# Patient Record
Sex: FEMALE | Race: WHITE | NOT HISPANIC OR LATINO | Employment: FULL TIME | URBAN - NONMETROPOLITAN AREA
[De-identification: names, ages, dates, MRNs, and addresses within clinical notes are randomized per-mention and may not be internally consistent; named-entity substitution may affect disease eponyms.]

---

## 2018-12-19 ENCOUNTER — OFFICE VISIT (OUTPATIENT)
Dept: FAMILY MEDICINE | Facility: OTHER | Age: 23
End: 2018-12-19
Attending: NURSE PRACTITIONER
Payer: COMMERCIAL

## 2018-12-19 VITALS
DIASTOLIC BLOOD PRESSURE: 62 MMHG | SYSTOLIC BLOOD PRESSURE: 108 MMHG | HEART RATE: 77 BPM | WEIGHT: 138 LBS | OXYGEN SATURATION: 99 %

## 2018-12-19 DIAGNOSIS — N92.6 IRREGULAR PERIODS: ICD-10-CM

## 2018-12-19 DIAGNOSIS — R10.84 ABDOMINAL PAIN, GENERALIZED: Primary | ICD-10-CM

## 2018-12-19 LAB
ALBUMIN SERPL-MCNC: 4 G/DL (ref 3.4–5)
ALBUMIN UR-MCNC: NEGATIVE MG/DL
ALP SERPL-CCNC: 95 U/L (ref 40–150)
ALT SERPL W P-5'-P-CCNC: 20 U/L (ref 0–50)
ANION GAP SERPL CALCULATED.3IONS-SCNC: 5 MMOL/L (ref 3–14)
APPEARANCE UR: CLEAR
AST SERPL W P-5'-P-CCNC: 9 U/L (ref 0–45)
BASOPHILS # BLD AUTO: 0 10E9/L (ref 0–0.2)
BASOPHILS NFR BLD AUTO: 0.5 %
BILIRUB SERPL-MCNC: 0.2 MG/DL (ref 0.2–1.3)
BILIRUB UR QL STRIP: NEGATIVE
BUN SERPL-MCNC: 13 MG/DL (ref 7–30)
CALCIUM SERPL-MCNC: 8.5 MG/DL (ref 8.5–10.1)
CHLORIDE SERPL-SCNC: 103 MMOL/L (ref 94–109)
CO2 SERPL-SCNC: 29 MMOL/L (ref 20–32)
COLOR UR AUTO: YELLOW
CREAT SERPL-MCNC: 0.73 MG/DL (ref 0.52–1.04)
CRP SERPL-MCNC: <2.9 MG/L (ref 0–8)
DIFFERENTIAL METHOD BLD: NORMAL
EOSINOPHIL # BLD AUTO: 0.1 10E9/L (ref 0–0.7)
EOSINOPHIL NFR BLD AUTO: 0.8 %
ERYTHROCYTE [DISTWIDTH] IN BLOOD BY AUTOMATED COUNT: 11.9 % (ref 10–15)
GFR SERPL CREATININE-BSD FRML MDRD: >90 ML/MIN/{1.73_M2}
GLUCOSE SERPL-MCNC: 69 MG/DL (ref 70–99)
GLUCOSE UR STRIP-MCNC: NEGATIVE MG/DL
HCG UR QL: NEGATIVE
HCT VFR BLD AUTO: 38.3 % (ref 35–47)
HGB BLD-MCNC: 13.5 G/DL (ref 11.7–15.7)
HGB UR QL STRIP: NEGATIVE
IMM GRANULOCYTES # BLD: 0 10E9/L (ref 0–0.4)
IMM GRANULOCYTES NFR BLD: 0.4 %
KETONES UR STRIP-MCNC: NEGATIVE MG/DL
LEUKOCYTE ESTERASE UR QL STRIP: NEGATIVE
LYMPHOCYTES # BLD AUTO: 2.4 10E9/L (ref 0.8–5.3)
LYMPHOCYTES NFR BLD AUTO: 31.2 %
MCH RBC QN AUTO: 31.4 PG (ref 26.5–33)
MCHC RBC AUTO-ENTMCNC: 35.2 G/DL (ref 31.5–36.5)
MCV RBC AUTO: 89 FL (ref 78–100)
MONOCYTES # BLD AUTO: 0.6 10E9/L (ref 0–1.3)
MONOCYTES NFR BLD AUTO: 8.2 %
NEUTROPHILS # BLD AUTO: 4.6 10E9/L (ref 1.6–8.3)
NEUTROPHILS NFR BLD AUTO: 58.9 %
NITRATE UR QL: NEGATIVE
NRBC # BLD AUTO: 0 10*3/UL
NRBC BLD AUTO-RTO: 0 /100
PH UR STRIP: 7 PH (ref 4.7–8)
PLATELET # BLD AUTO: 255 10E9/L (ref 150–450)
POTASSIUM SERPL-SCNC: 3.9 MMOL/L (ref 3.4–5.3)
PROT SERPL-MCNC: 7.3 G/DL (ref 6.8–8.8)
RBC # BLD AUTO: 4.3 10E12/L (ref 3.8–5.2)
SODIUM SERPL-SCNC: 137 MMOL/L (ref 133–144)
SOURCE: NORMAL
SP GR UR STRIP: 1.02 (ref 1–1.03)
TSH SERPL DL<=0.005 MIU/L-ACNC: 1.14 MU/L (ref 0.4–4)
UROBILINOGEN UR STRIP-MCNC: NORMAL MG/DL (ref 0–2)
WBC # BLD AUTO: 7.8 10E9/L (ref 4–11)

## 2018-12-19 PROCEDURE — 81003 URINALYSIS AUTO W/O SCOPE: CPT | Performed by: NURSE PRACTITIONER

## 2018-12-19 PROCEDURE — 81025 URINE PREGNANCY TEST: CPT | Performed by: NURSE PRACTITIONER

## 2018-12-19 PROCEDURE — 99203 OFFICE O/P NEW LOW 30 MIN: CPT | Performed by: NURSE PRACTITIONER

## 2018-12-19 PROCEDURE — 36415 COLL VENOUS BLD VENIPUNCTURE: CPT | Performed by: NURSE PRACTITIONER

## 2018-12-19 PROCEDURE — 86140 C-REACTIVE PROTEIN: CPT | Performed by: NURSE PRACTITIONER

## 2018-12-19 PROCEDURE — 80050 GENERAL HEALTH PANEL: CPT | Performed by: NURSE PRACTITIONER

## 2018-12-19 ASSESSMENT — ANXIETY QUESTIONNAIRES
4. TROUBLE RELAXING: NOT AT ALL
2. NOT BEING ABLE TO STOP OR CONTROL WORRYING: NOT AT ALL
7. FEELING AFRAID AS IF SOMETHING AWFUL MIGHT HAPPEN: NOT AT ALL
GAD7 TOTAL SCORE: 0
3. WORRYING TOO MUCH ABOUT DIFFERENT THINGS: NOT AT ALL
5. BEING SO RESTLESS THAT IT IS HARD TO SIT STILL: NOT AT ALL
6. BECOMING EASILY ANNOYED OR IRRITABLE: NOT AT ALL
1. FEELING NERVOUS, ANXIOUS, OR ON EDGE: NOT AT ALL
IF YOU CHECKED OFF ANY PROBLEMS ON THIS QUESTIONNAIRE, HOW DIFFICULT HAVE THESE PROBLEMS MADE IT FOR YOU TO DO YOUR WORK, TAKE CARE OF THINGS AT HOME, OR GET ALONG WITH OTHER PEOPLE: NOT DIFFICULT AT ALL

## 2018-12-19 ASSESSMENT — PAIN SCALES - GENERAL: PAINLEVEL: NO PAIN (0)

## 2018-12-19 ASSESSMENT — PATIENT HEALTH QUESTIONNAIRE - PHQ9: SUM OF ALL RESPONSES TO PHQ QUESTIONS 1-9: 3

## 2018-12-20 ASSESSMENT — ANXIETY QUESTIONNAIRES: GAD7 TOTAL SCORE: 0

## 2018-12-20 NOTE — NURSING NOTE
Chief Complaint   Patient presents with     Establish Care     Abdominal Pain       Initial /62 (BP Location: Right arm, Patient Position: Sitting, Cuff Size: Adult Regular)   Pulse 77   Wt 62.6 kg (138 lb)   SpO2 99%  There is no height or weight on file to calculate BMI.  Medication Reconciliation: complete    Kirstin Joseph MA

## 2018-12-20 NOTE — PATIENT INSTRUCTIONS
Patient Education     Unknown Causes of Abdominal Pain (Female)    The exact cause of your belly (abdominal) pain is not clear. This does not mean that this is something to worry about. Everyone likes to know the exact cause of the problem. But sometimes with belly pain, there is no clear-cut cause, and this could be a good thing. The good news is that your symptoms can be treated, and you will feel better.   Your condition does not seem serious now. But sometimes the signs of a serious problem may take more time to appear. For this reason, it is important for you to watch for any new symptoms, problems, or worsening of your condition.  Over the next few days, the abdominal pain may come and go. Or it may be constant. Other common symptoms can include nausea and vomiting. Sometimes it can be difficult to tell if you feel nauseous. You may just feel bad and not connect that feeling to nausea. Constipation, diarrhea, and a fever may go along with the pain.  The pain may continue even if treated correctly over the following days. Depending on how things go, sometimes the cause can become clear and may need more or different treatment. Additional evaluations, medicines, or tests may also be needed.  Home care  Your healthcare provider may prescribe medicine for pain, symptoms, or an infection.  Follow the healthcare provider's instructions for taking these medicines.  General care    Rest as much as you can until your next exam. No strenuous activities.    Try to find positions that ease discomfort. A small pillow placed on the abdomen may help relieve pain.    Something warm on your abdomen (such as a heating pad) may help, but be careful not to burn yourself.  Diet    Don t force yourself to eat, especially if having cramps, vomiting, or diarrhea.    Water is important so you don't get dehydrated. Soup may also be good. Sports drinks may also help, especially if they are not too acidic. Don't drink sugary drinks as  this can make things worse. Take liquids in small amounts. Don t guzzle them.    Caffeine sometimes makes the pain and cramping worse.    Don t take dairy products if you have vomiting or diarrhea.    Don't eat large amounts at a time. Wait a few minutes between bites.    Eat a diet low in fiber (called a low-residue diet). Foods allowed include refined breads, white rice, fruit and vegetable juices without pulp, tender meats. These foods will pass more easily through the intestine.    Don t have whole-grain foods, whole fruits and vegetables, meats, seeds and nuts, fried or fatty foods, dairy, alcohol and spicy foods until your symptoms go away.  Follow-up care  Follow up with your healthcare provider, or as advised, if your pain does not begin to improve in the next 24 hours.  Call 911  Call 911 if any of these occur:    Trouble breathing    Confusion    Fainting or loss of consciousness    Rapid heart rate    Seizure  When to seek medical advice  Call your healthcare provider right away if any of these occur:    Pain gets worse or moves to the right lower abdomen    New or worsening vomiting or diarrhea    Swelling of the abdomen    Unable to pass stool for more than 3 days    Fever of 100.4 F (38 C) or higher, or as directed by your healthcare provider.    Blood in vomit or bowel movements (dark red or black color)    Yellow color of eyes and skin (jaundice)    Weakness, dizziness    Chest, arm, back, neck, or jaw pain    Unexpected vaginal bleeding or missed period    Can't keep down liquids or water and you are getting dehydrated  Date Last Reviewed: 6/1/2018 2000-2018 [employer]. [address] [place] [address], [place] [address]. All rights reserved. This information is not intended as a substitute for professional medical care. Always follow your healthcare professional's instructions.    Patient Education     Polycystic Ovary Syndrome  Polycystic ovary syndrome (PCOS) causes harmless, small cysts  in the ovaries and other symptoms. PCOS is caused by certain hormones being out of balance. The word  syndrome  means a group of symptoms. Women with PCOS may have no periods, irregular periods, or very long periods.    Your ovaries  Women store their eggs in their ovaries. Each egg is in a capsule called a follicle. Normally during the reproductive years, one follicle grows to produce a mature egg each month. This egg is released during ovulation and the follicle dissolves.  Hormones out of balance  With polycystic ovary syndrome (PCOS), the hormones that control ovulation are out of balance. These include estrogen, progesterone, and androgen. As a result, ovulation may not occur. Instead, the follicle stays enlarged. This is a fluid-filled sac called a cyst. Over time, the ovaries fill with many small cysts. This is why they are called  poly  or many  cystic  ovaries. In some women, the ovaries also make too much androgen.  PCOS symptoms  Women with PCOS may also have one or more of these symptoms:    Acne    Hair growth on the face and other parts of the body    Patches of thickened, velvety, darkened skin called acanthosis nigricans    Trouble getting pregnant (fertility problems)    Weight gain, especially around the waist   Women with PCOS are also at increased risk of developing cancer of the uterine lining, diabetes, and heart disease.   Date Last Reviewed: 6/1/2017 2000-2018 [employer]. [address] [place] [address], [place] [address]. All rights reserved. This information is not intended as a substitute for professional medical care. Always follow your healthcare professional's instructions.

## 2018-12-20 NOTE — PROGRESS NOTES
SUBJECTIVE:   Obie Norris is a 23 year old female who presents to clinic today for the following health issues:      ESTABLISH CARE    Moved to the area from [facility]. She states she moved to the [facility] in July.     Lower Abdominal Pain      Duration: ongoing, gets it 1-2 times weekly.  Initial onset 3-4 months    Description (location/character/radiation): lower abdominal. Comes and goes. Lasts for about 4-5 minutes.    Intensity:  severe    Accompanying signs and symptoms: non-formed stools for last 4 months, passes stools 3-4 times daily    History (similar episodes/previous evaluation): was told she had PCOS but then told she did not have it.  Mom has stage 4 colon cancer diagnosed at age 55 at her first colonoscopy she did not have any priior symptoms      Precipitating or alleviating factors: None    Therapies tried and outcome: None         Problem list and histories reviewed & adjusted, as indicated.  Additional history: as documented    There is no problem list on file for this patient.    History reviewed. No pertinent surgical history.    Social History     Tobacco Use     Smoking status: Never Smoker     Smokeless tobacco: Never Used   Substance Use Topics     Alcohol use: Not on file     Family History   Problem Relation Age of Onset     Colorectal Cancer Mother      Throat cancer Father      Heart Disease Maternal Grandfather      Rheumatoid Arthritis Sister          No current outpatient medications on file.     No Known Allergies    Reviewed and updated as needed this visit by clinical staff  Tobacco  Allergies  Meds  Med Hx  Surg Hx  Fam Hx  Soc Hx      Reviewed and updated as needed this visit by Provider         ROS:  CONSTITUTIONAL:NEGATIVE for fever, chills, change in weight  INTEGUMENTARY/SKIN: NEGATIVE for worrisome rashes, moles or lesions  ENT/MOUTH: NEGATIVE for ear, mouth and throat problems  RESP:NEGATIVE for significant cough or SOB  CV: NEGATIVE for chest pain, palpitations or  peripheral edema  GI: lower abdominal cramping, abdominal pain generalized and gas or bloating  : abnormal menstrual cycle about 2-3 per year, when she was on birth control she had regular periods   MUSCULOSKELETAL: NEGATIVE for significant arthralgias or myalgia  NEURO: hand and feet fall asleep   ENDOCRINE: irregular periods   PSYCHIATRIC: NEGATIVE for changes in mood or affect    OBJECTIVE:     /62 (BP Location: Right arm, Patient Position: Sitting, Cuff Size: Adult Regular)   Pulse 77   Wt 62.6 kg (138 lb)   SpO2 99%   There is no height or weight on file to calculate BMI.   GENERAL: healthy, alert and no distress  HENT: ear canals and TM's normal, nose and mouth without ulcers or lesions  NECK: no adenopathy, no asymmetry, masses, or scars and thyroid normal to palpation  RESP: lungs clear to auscultation - no rales, rhonchi or wheezes  CV: regular rate and rhythm, normal S1 S2, no S3 or S4, no murmur, click or rub, no peripheral edema and peripheral pulses strong  ABDOMEN: tenderness mild tenderness across lower abdomen and bowel sounds normal  MS: no gross musculoskeletal defects noted, no edema  SKIN: no suspicious lesions or rashes  NEURO: Normal strength and tone, mentation intact and speech normal  PSYCH: mentation appears normal, affect normal/bright  LYMPH: normal ant/post cervical, supraclavicular nodes    Diagnostic Test Results:  Results for orders placed or performed in visit on 12/19/18 (from the past 24 hour(s))   CBC with platelets differential   Result Value Ref Range    WBC 7.8 4.0 - 11.0 10e9/L    RBC Count 4.30 3.8 - 5.2 10e12/L    Hemoglobin 13.5 11.7 - 15.7 g/dL    Hematocrit 38.3 35.0 - 47.0 %    MCV 89 78 - 100 fl    MCH 31.4 26.5 - 33.0 pg    MCHC 35.2 31.5 - 36.5 g/dL    RDW 11.9 10.0 - 15.0 %    Platelet Count 255 150 - 450 10e9/L    Diff Method Automated Method     % Neutrophils 58.9 %    % Lymphocytes 31.2 %    % Monocytes 8.2 %    % Eosinophils 0.8 %    % Basophils 0.5 %     % Immature Granulocytes 0.4 %    Nucleated RBCs 0 0 /100    Absolute Neutrophil 4.6 1.6 - 8.3 10e9/L    Absolute Lymphocytes 2.4 0.8 - 5.3 10e9/L    Absolute Monocytes 0.6 0.0 - 1.3 10e9/L    Absolute Eosinophils 0.1 0.0 - 0.7 10e9/L    Absolute Basophils 0.0 0.0 - 0.2 10e9/L    Abs Immature Granulocytes 0.0 0 - 0.4 10e9/L    Absolute Nucleated RBC 0.0    UA reflex to Microscopic and Culture - [place]   Result Value Ref Range    Color Urine Yellow     Appearance Urine Clear     Glucose Urine Negative NEG^Negative mg/dL    Bilirubin Urine Negative NEG^Negative    Ketones Urine Negative NEG^Negative mg/dL    Specific Gravity Urine 1.020 1.003 - 1.035    Blood Urine Negative NEG^Negative    pH Urine 7.0 4.7 - 8.0 pH    Protein Albumin Urine Negative NEG^Negative mg/dL    Urobilinogen mg/dL Normal 0.0 - 2.0 mg/dL    Nitrite Urine Negative NEG^Negative    Leukocyte Esterase Urine Negative NEG^Negative    Source Midstream Urine    HCG Qual, Urine - [facility],  [facility]  ([id number])   Result Value Ref Range    HCG Qual Urine Negative NEG^Negative     Additional labs pending plan to notify once available.     ASSESSMENT/PLAN:     1. Abdominal pain, generalized  Plan for referral to ob/gyn due to irregular periods and history of ovarian cyst.  - Comprehensive metabolic panel (BMP + Alb, Alk Phos, ALT, AST, Total. Bili, TP)  - CBC with platelets differential  - CRP, inflammation  - TSH with free T4 reflex  - UA reflex to Microscopic and Culture - [place]  - HCG Qual, Urine - [facility],  [facility]  ([id number])  - US TRANSVAGINAL, NON-OB; Future    2. Irregular periods  As above  - US TRANSVAGINAL, NON-OB; Future  - OB/GYN REFERRAL    If US negative may need to consider additional imaging.     See Patient Instructions    [facility] - [place]

## 2018-12-21 ENCOUNTER — OFFICE VISIT (OUTPATIENT)
Dept: OBGYN | Facility: OTHER | Age: 23
End: 2018-12-21
Attending: NURSE PRACTITIONER
Payer: COMMERCIAL

## 2018-12-21 VITALS
DIASTOLIC BLOOD PRESSURE: 62 MMHG | WEIGHT: 138 LBS | HEIGHT: 62 IN | SYSTOLIC BLOOD PRESSURE: 104 MMHG | BODY MASS INDEX: 25.4 KG/M2

## 2018-12-21 DIAGNOSIS — E28.2 PCOS (POLYCYSTIC OVARIAN SYNDROME): ICD-10-CM

## 2018-12-21 DIAGNOSIS — R10.2 PELVIC PAIN IN FEMALE: Primary | ICD-10-CM

## 2018-12-21 DIAGNOSIS — K58.0 IRRITABLE BOWEL SYNDROME WITH DIARRHEA: ICD-10-CM

## 2018-12-21 PROBLEM — N93.8 DUB (DYSFUNCTIONAL UTERINE BLEEDING): Status: ACTIVE | Noted: 2018-12-21

## 2018-12-21 PROCEDURE — 99214 OFFICE O/P EST MOD 30 MIN: CPT | Performed by: OBSTETRICS & GYNECOLOGY

## 2018-12-21 PROCEDURE — 99000 SPECIMEN HANDLING OFFICE-LAB: CPT | Performed by: OBSTETRICS & GYNECOLOGY

## 2018-12-21 PROCEDURE — 36415 COLL VENOUS BLD VENIPUNCTURE: CPT | Performed by: OBSTETRICS & GYNECOLOGY

## 2018-12-21 PROCEDURE — 84270 ASSAY OF SEX HORMONE GLOBUL: CPT | Mod: 90 | Performed by: OBSTETRICS & GYNECOLOGY

## 2018-12-21 PROCEDURE — 84403 ASSAY OF TOTAL TESTOSTERONE: CPT | Mod: 90 | Performed by: OBSTETRICS & GYNECOLOGY

## 2018-12-21 PROCEDURE — 82627 DEHYDROEPIANDROSTERONE: CPT | Mod: 90 | Performed by: OBSTETRICS & GYNECOLOGY

## 2018-12-21 RX ORDER — SPIRONOLACTONE 25 MG/1
25 TABLET ORAL EVERY MORNING
Qty: 90 TABLET | Refills: 3 | Status: SHIPPED | OUTPATIENT
Start: 2018-12-21 | End: 2019-01-18

## 2018-12-21 RX ORDER — METFORMIN HCL 500 MG
500 TABLET, EXTENDED RELEASE 24 HR ORAL
Qty: 90 TABLET | Refills: 3 | Status: SHIPPED | OUTPATIENT
Start: 2018-12-21 | End: 2019-01-18

## 2018-12-21 RX ORDER — ACETAMINOPHEN AND CODEINE PHOSPHATE 120; 12 MG/5ML; MG/5ML
0.35 SOLUTION ORAL EVERY MORNING
Qty: 84 TABLET | Refills: 4 | Status: SHIPPED | OUTPATIENT
Start: 2018-12-21 | End: 2019-01-18

## 2018-12-21 ASSESSMENT — MIFFLIN-ST. JEOR: SCORE: 1329.21

## 2018-12-21 ASSESSMENT — PAIN SCALES - GENERAL: PAINLEVEL: NO PAIN (0)

## 2018-12-21 NOTE — NURSING NOTE
"Chief Complaint   Patient presents with     Consult     Consult/ irregular periods/ [place] referring        Initial /62 (BP Location: Left arm, Patient Position: Chair, Cuff Size: Adult Regular)   Ht 1.575 m (5' 2\")   Wt 62.6 kg (138 lb)   LMP 12/21/2018   BMI 25.24 kg/m   Estimated body mass index is 25.24 kg/m  as calculated from the following:    Height as of this encounter: 1.575 m (5' 2\").    Weight as of this encounter: 62.6 kg (138 lb).  Medication Reconciliation: complete    Leigh Ann Justin LPN    "

## 2018-12-21 NOTE — PROGRESS NOTES
S:   Signs and symptoms of PCOS, always irregular and sometimes infrequent menses.  Evidence of androgen excess with cystic acne and hair growth.   Has alternating and episodic sharp, stabbing, double over RIGHT and LEFT lower quadrant pain with loose stools  Feels like she may have PCOS    Lab drawn by her primary care.    O:   Lab that been done reviewed    A:   IBS with loose stool component        PCOS clinical DX        Cystic acne secondary to PCOS    P:    Discussed at length PCOS abnormal physiology, treatment and will need Ovulation Induction when she is ready for PG.    Will fully suppress ovaries with Metformin, Nor-q, and Aldactone in meantime  IBS diet and Dicyclomine    US and follow-up visit in 4 weeks    Androgen lab studies

## 2018-12-26 LAB — DHEA-S SERPL-MCNC: 239 UG/DL (ref 35–430)

## 2018-12-28 LAB
SHBG SERPL-SCNC: 24 NMOL/L (ref 30–135)
TESTOST FREE SERPL-MCNC: 0.53 NG/DL (ref 0.08–0.74)
TESTOST SERPL-MCNC: 25 NG/DL (ref 8–60)

## 2019-01-18 ENCOUNTER — OFFICE VISIT (OUTPATIENT)
Dept: OBGYN | Facility: OTHER | Age: 24
End: 2019-01-18
Attending: OBSTETRICS & GYNECOLOGY
Payer: COMMERCIAL

## 2019-01-18 ENCOUNTER — HOSPITAL ENCOUNTER (OUTPATIENT)
Dept: ULTRASOUND IMAGING | Facility: HOSPITAL | Age: 24
Discharge: HOME | End: 2019-01-18
Attending: OBSTETRICS & GYNECOLOGY | Admitting: OBSTETRICS & GYNECOLOGY
Payer: COMMERCIAL

## 2019-01-18 VITALS
SYSTOLIC BLOOD PRESSURE: 104 MMHG | BODY MASS INDEX: 25.03 KG/M2 | DIASTOLIC BLOOD PRESSURE: 60 MMHG | WEIGHT: 136 LBS | HEART RATE: 60 BPM | HEIGHT: 62 IN

## 2019-01-18 DIAGNOSIS — E73.9 LACTOSE INTOLERANCE IN ADULT: ICD-10-CM

## 2019-01-18 DIAGNOSIS — E28.2 PCOS (POLYCYSTIC OVARIAN SYNDROME): ICD-10-CM

## 2019-01-18 DIAGNOSIS — R10.2 PELVIC PAIN IN FEMALE: ICD-10-CM

## 2019-01-18 DIAGNOSIS — N93.8 DUB (DYSFUNCTIONAL UTERINE BLEEDING): Primary | ICD-10-CM

## 2019-01-18 DIAGNOSIS — K58.0 IRRITABLE BOWEL SYNDROME WITH DIARRHEA: ICD-10-CM

## 2019-01-18 PROCEDURE — 76830 TRANSVAGINAL US NON-OB: CPT | Mod: TC

## 2019-01-18 PROCEDURE — 99213 OFFICE O/P EST LOW 20 MIN: CPT | Performed by: OBSTETRICS & GYNECOLOGY

## 2019-01-18 RX ORDER — DICYCLOMINE HYDROCHLORIDE 10 MG/1
10 CAPSULE ORAL
Qty: 360 CAPSULE | Refills: 3 | Status: SHIPPED | OUTPATIENT
Start: 2019-01-18 | End: 2020-01-22

## 2019-01-18 RX ORDER — SPIRONOLACTONE 25 MG/1
25 TABLET ORAL 2 TIMES DAILY WITH MEALS
Qty: 90 TABLET | Refills: 3 | Status: SHIPPED | OUTPATIENT
Start: 2019-01-18 | End: 2019-07-26

## 2019-01-18 RX ORDER — ACETAMINOPHEN AND CODEINE PHOSPHATE 120; 12 MG/5ML; MG/5ML
0.35 SOLUTION ORAL 2 TIMES DAILY WITH MEALS
Qty: 84 TABLET | Refills: 4 | Status: SHIPPED | OUTPATIENT
Start: 2019-01-18 | End: 2019-11-01

## 2019-01-18 RX ORDER — METFORMIN HCL 500 MG
500 TABLET, EXTENDED RELEASE 24 HR ORAL 2 TIMES DAILY WITH MEALS
Qty: 90 TABLET | Refills: 3 | Status: SHIPPED | OUTPATIENT
Start: 2019-01-18 | End: 2019-06-24

## 2019-01-18 ASSESSMENT — MIFFLIN-ST. JEOR: SCORE: 1320.14

## 2019-01-18 ASSESSMENT — PAIN SCALES - GENERAL: PAINLEVEL: NO PAIN (0)

## 2019-01-18 NOTE — PATIENT INSTRUCTIONS
Will increase all PCOS meds to full dose twice daily.   Add Bentyl for IBS  Continue lactose and gluten free diet.

## 2019-01-18 NOTE — NURSING NOTE
"Chief Complaint   Patient presents with     RECHECK       Initial /60   Pulse 60   Ht 1.575 m (5' 2\")   Wt 61.7 kg (136 lb)   LMP 12/21/2018   BMI 24.87 kg/m   Estimated body mass index is 24.87 kg/m  as calculated from the following:    Height as of this encounter: 1.575 m (5' 2\").    Weight as of this encounter: 61.7 kg (136 lb).  Medication Reconciliation: complete    Krystyna Burns LPN    "

## 2019-01-18 NOTE — PROGRESS NOTES
S:   Doing much better with gluten free and lactose free diets. Did not get bentyl at pharmacy.  Doing well with all pcos meds.    O:    Lab reviewed          US reviewed and shows CLASSIC peripheral PCOS cortical follicles in both ovaries.    A:   PCOS        Gluten and lactose sensitivity with IBS    P:   Increase PCOS meds to full dosaging at twice daily.         Add Bentyl for IBS         Follow-up 2 months

## 2019-03-12 ENCOUNTER — OFFICE VISIT (OUTPATIENT)
Dept: OBGYN | Facility: OTHER | Age: 24
End: 2019-03-12
Attending: OBSTETRICS & GYNECOLOGY
Payer: COMMERCIAL

## 2019-03-12 VITALS
WEIGHT: 134 LBS | SYSTOLIC BLOOD PRESSURE: 100 MMHG | HEIGHT: 62 IN | BODY MASS INDEX: 24.66 KG/M2 | DIASTOLIC BLOOD PRESSURE: 68 MMHG | HEART RATE: 56 BPM

## 2019-03-12 DIAGNOSIS — E28.2 PCOS (POLYCYSTIC OVARIAN SYNDROME): Primary | ICD-10-CM

## 2019-03-12 DIAGNOSIS — K58.0 IRRITABLE BOWEL SYNDROME WITH DIARRHEA: ICD-10-CM

## 2019-03-12 DIAGNOSIS — N93.8 DUB (DYSFUNCTIONAL UTERINE BLEEDING): ICD-10-CM

## 2019-03-12 PROCEDURE — 99212 OFFICE O/P EST SF 10 MIN: CPT | Performed by: OBSTETRICS & GYNECOLOGY

## 2019-03-12 ASSESSMENT — MIFFLIN-ST. JEOR: SCORE: 1306.07

## 2019-03-12 ASSESSMENT — PAIN SCALES - GENERAL: PAINLEVEL: NO PAIN (0)

## 2019-03-12 NOTE — PATIENT INSTRUCTIONS
Continue all PCOS meds.    See me before conception trial as patient will need Ovulation Induction and meds will need to be managed.  Needs to stay on all meds permanently until desires conception.

## 2019-03-12 NOTE — NURSING NOTE
"Chief Complaint   Patient presents with     RECHECK       Initial /68   Pulse 56   Ht 1.575 m (5' 2\")   Wt 60.8 kg (134 lb)   BMI 24.51 kg/m   Estimated body mass index is 24.51 kg/m  as calculated from the following:    Height as of this encounter: 1.575 m (5' 2\").    Weight as of this encounter: 60.8 kg (134 lb).  Medication Reconciliation: complete    Krystyna Burns LPN    "

## 2019-03-12 NOTE — PROGRESS NOTES
S:   No complaints, menses are light, feeling well. Tolerating medications. IBS is doing much better with diet changes.    O:   NA    A:   PCOS and IBS are stable    P:   Maintain all PCOS medications permanently until patient desires conception then she will see me  BEFORE conception trial as she will need Ovulation Induction and her medications will need management during this process.

## 2019-07-12 ENCOUNTER — APPOINTMENT (OUTPATIENT)
Dept: OCCUPATIONAL MEDICINE | Facility: OTHER | Age: 24
End: 2019-07-12

## 2019-07-18 ENCOUNTER — APPOINTMENT (OUTPATIENT)
Dept: OCCUPATIONAL MEDICINE | Facility: OTHER | Age: 24
End: 2019-07-18

## 2019-07-26 DIAGNOSIS — E28.2 PCOS (POLYCYSTIC OVARIAN SYNDROME): ICD-10-CM

## 2019-07-26 RX ORDER — SPIRONOLACTONE 25 MG/1
TABLET ORAL
Qty: 60 TABLET | Refills: 0 | Status: SHIPPED | OUTPATIENT
Start: 2019-07-26 | End: 2019-09-04

## 2019-07-26 NOTE — TELEPHONE ENCOUNTER
aldactone  Last Written Prescription Date: 1/18/19  Last Fill Quantity: 90 # of Refills: 3  Last Office Visit: 3/12/19

## 2019-09-12 ENCOUNTER — OFFICE VISIT (OUTPATIENT)
Dept: FAMILY MEDICINE | Facility: OTHER | Age: 24
End: 2019-09-12
Attending: FAMILY MEDICINE
Payer: COMMERCIAL

## 2019-09-12 VITALS
OXYGEN SATURATION: 100 % | WEIGHT: 138.2 LBS | SYSTOLIC BLOOD PRESSURE: 114 MMHG | HEART RATE: 80 BPM | TEMPERATURE: 96.8 F | DIASTOLIC BLOOD PRESSURE: 66 MMHG | HEIGHT: 62 IN | RESPIRATION RATE: 14 BRPM | BODY MASS INDEX: 25.43 KG/M2

## 2019-09-12 DIAGNOSIS — R07.0 THROAT PAIN: Primary | ICD-10-CM

## 2019-09-12 LAB
DEPRECATED S PYO AG THROAT QL EIA: NORMAL
SPECIMEN SOURCE: NORMAL

## 2019-09-12 PROCEDURE — 99213 OFFICE O/P EST LOW 20 MIN: CPT | Performed by: FAMILY MEDICINE

## 2019-09-12 PROCEDURE — 87081 CULTURE SCREEN ONLY: CPT | Performed by: FAMILY MEDICINE

## 2019-09-12 PROCEDURE — 87880 STREP A ASSAY W/OPTIC: CPT | Performed by: FAMILY MEDICINE

## 2019-09-12 ASSESSMENT — MIFFLIN-ST. JEOR: SCORE: 1325.12

## 2019-09-12 ASSESSMENT — PAIN SCALES - GENERAL: PAINLEVEL: MODERATE PAIN (5)

## 2019-09-12 NOTE — PROGRESS NOTES
Carlee Norris is a 24 year old female who presents to clinic today for the following health issues:    HPI     RESPIRATORY SYMPTOMS      Duration: 1 day    Description  nasal congestion, sore throat, chills, ear pain right, fatigue/malaise and hoarse voice    Severity: mild    Accompanying signs and symptoms: None    History (predisposing factors):  none    Precipitating or alleviating factors: None    Therapies tried and outcome:  none        Patient Active Problem List   Diagnosis     DUB (dysfunctional uterine bleeding)-always--12/2018     PCOS (polycystic ovarian syndrome)--clinical signs and syptoms--12/2018     Irritable bowel syndrome with loose stools--IBS diet,Dicyclomine--12/2018     History reviewed. No pertinent surgical history.    Social History     Tobacco Use     Smoking status: Never Smoker     Smokeless tobacco: Never Used   Substance Use Topics     Alcohol use: Not on file     Family History   Problem Relation Age of Onset     Colorectal Cancer Mother      Throat cancer Father      Heart Disease Maternal Grandfather      Rheumatoid Arthritis Sister          Current Outpatient Medications   Medication Sig Dispense Refill     metFORMIN (GLUCOPHAGE-XR) 500 MG 24 hr tablet TAKE 1 TABLET BY MOUTH TWICE DAILY, WITH MEALS 60 tablet 2     norethindrone (MICRONOR) 0.35 MG tablet Take 1 tablet (0.35 mg) by mouth 2 times daily (with meals) 84 tablet 4     spironolactone (ALDACTONE) 25 MG tablet TAKE 1 TABLET BY MOUTH TWICE DAILY, WITH MEALS 60 tablet 0     dicyclomine (BENTYL) 10 MG capsule Take 1 capsule (10 mg) by mouth 4 times daily (before meals and nightly) (Patient not taking: Reported on 3/12/2019) 360 capsule 3     No Known Allergies    Reviewed and updated as needed this visit by Provider         Review of Systems   ROS COMP: Constitutional, HEENT, cardiovascular, pulmonary, gi and gu systems are negative, except as otherwise noted.      Objective    /66 (BP Location: Right  "arm, Patient Position: Sitting, Cuff Size: Adult Regular)   Pulse 80   Temp 96.8  F (36  C) (Tympanic)   Resp 14   Ht 1.575 m (5' 2\")   Wt 62.7 kg (138 lb 3.2 oz)   SpO2 100%   BMI 25.28 kg/m    Body mass index is 25.28 kg/m .  Physical Exam   GENERAL: healthy, alert and no distress  EYES: Eyes grossly normal to inspection, PERRL and conjunctivae and sclerae normal  HENT: normal cephalic/atraumatic, both ears: clear effusion, nasal mucosa edematous , rhinorrhea clear, oropharynx clear and oral mucous membranes moist  NECK: no adenopathy  RESP: lungs clear to auscultation - no rales, rhonchi or wheezes  CV: regular rates and rhythm, normal S1 S2, no S3 or S4 and no murmur, click or rub  PSYCH: mentation appears normal, affect normal/bright    Diagnostic Test Results:  Results for orders placed or performed in visit on 09/12/19 (from the past 24 hour(s))   Rapid strep screen   Result Value Ref Range    Specimen Description Throat     Rapid Strep A Screen       NEGATIVE: No Group A streptococcal antigen detected by immunoassay, await culture report.           Assessment & Plan     1. Throat pain  Possible PND from allergies vs viral vs other.  Symptomatic cares recommended.  Follow-up if fever develops, if culture is positive, or if no improvement noted.  - Rapid strep screen  - Beta strep group A culture     BMI:   Estimated body mass index is 25.28 kg/m  as calculated from the following:    Height as of this encounter: 1.575 m (5' 2\").    Weight as of this encounter: 62.7 kg (138 lb 3.2 oz).         Return if symptoms worsen or fail to improve.    Tracy Bass MD  [facility] - [facility]      "

## 2019-09-12 NOTE — NURSING NOTE
"Chief Complaint   Patient presents with     URI       Initial /66 (BP Location: Right arm, Patient Position: Sitting, Cuff Size: Adult Regular)   Pulse 80   Temp 96.8  F (36  C) (Tympanic)   Resp 14   Ht 1.575 m (5' 2\")   Wt 62.7 kg (138 lb 3.2 oz)   SpO2 100%   BMI 25.28 kg/m   Estimated body mass index is 25.28 kg/m  as calculated from the following:    Height as of this encounter: 1.575 m (5' 2\").    Weight as of this encounter: 62.7 kg (138 lb 3.2 oz).  Medication Reconciliation: complete  "

## 2019-09-14 LAB
BACTERIA SPEC CULT: NORMAL
SPECIMEN SOURCE: NORMAL

## 2019-10-16 DIAGNOSIS — E28.2 PCOS (POLYCYSTIC OVARIAN SYNDROME): ICD-10-CM

## 2019-10-16 NOTE — TELEPHONE ENCOUNTER
metFORMIN (GLUCOPHAGE-XR) 500 MG 24 hr tablet  Last Written Prescription Date:  6-24-19  Last Fill Quantity: 60,   # refills: 2  Last Office Visit: 9-12-19  Future Office visit:

## 2019-10-18 RX ORDER — METFORMIN HCL 500 MG
TABLET, EXTENDED RELEASE 24 HR ORAL
Qty: 60 TABLET | Refills: 2 | Status: SHIPPED | OUTPATIENT
Start: 2019-10-18 | End: 2019-11-15

## 2019-11-01 ENCOUNTER — TELEPHONE (OUTPATIENT)
Dept: OBGYN | Facility: OTHER | Age: 24
End: 2019-11-01

## 2019-11-01 DIAGNOSIS — E28.2 PCOS (POLYCYSTIC OVARIAN SYNDROME): ICD-10-CM

## 2019-11-01 DIAGNOSIS — N93.8 DUB (DYSFUNCTIONAL UTERINE BLEEDING): ICD-10-CM

## 2019-11-01 DIAGNOSIS — E28.2 PCOS (POLYCYSTIC OVARIAN SYNDROME): Primary | ICD-10-CM

## 2019-11-01 RX ORDER — ACETAMINOPHEN AND CODEINE PHOSPHATE 120; 12 MG/5ML; MG/5ML
0.35 SOLUTION ORAL 2 TIMES DAILY WITH MEALS
Qty: 84 TABLET | Refills: 4 | Status: SHIPPED | OUTPATIENT
Start: 2019-11-01 | End: 2019-11-06

## 2019-11-01 RX ORDER — ACETAMINOPHEN AND CODEINE PHOSPHATE 120; 12 MG/5ML; MG/5ML
0.35 SOLUTION ORAL 2 TIMES DAILY WITH MEALS
Qty: 84 TABLET | Refills: 4 | Status: SHIPPED | OUTPATIENT
Start: 2019-11-01 | End: 2019-11-01

## 2019-11-01 RX ORDER — ACETAMINOPHEN AND CODEINE PHOSPHATE 120; 12 MG/5ML; MG/5ML
0.35 SOLUTION ORAL 2 TIMES DAILY WITH MEALS
Qty: 56 TABLET | Refills: 11 | Status: SHIPPED | OUTPATIENT
Start: 2019-11-01 | End: 2020-11-27

## 2019-11-01 NOTE — TELEPHONE ENCOUNTER
Pt. Is calling for a refill of micronor 0.35 po twice a day to [facility] pharmacy here please.  Krystyna Burns LPN

## 2019-11-04 NOTE — PROGRESS NOTES
SUBJECTIVE:   CC: Obie Norris is an 24 year old woman who presents for preventive health visit.     New Patient/Transfer of Care with Annual Physical Exam.    Healthy Habits:    Do you get at least three servings of calcium containing foods daily (dairy, green leafy vegetables, etc.)? yes    Amount of exercise or daily activities, outside of work: 3 day(s) per week    Problems taking medications regularly No    Medication side effects: No    Have you had an eye exam in the past two years? no    Do you see a dentist twice per year? yes    Do you have sleep apnea, excessive snoring or daytime drowsiness?no      Back Pain upper spine  States she has had for years slight improvement with chiropractor adjustment  She state lump and swollen area remain    Fatigue  Increased fatigue   Sleeping 10 or more hours at night    Today's PHQ-2 Score: No flowsheet data found.    PHQ-9 SCORE 12/19/2018   PHQ-9 Total Score 3     SONNY-7 SCORE 12/19/2018   Total Score 0       Abuse: Current or Past(Physical, Sexual or Emotional)- No  Do you feel safe in your environment? Yes        Social History     Tobacco Use     Smoking status: Never Smoker     Smokeless tobacco: Never Used   Substance Use Topics     Alcohol use: Not on file     If you drink alcohol do you typically have >3 drinks per day or >7 drinks per week? No                     Reviewed orders with patient.  Reviewed health maintenance and updated orders accordingly - Yes  Labs reviewed in EPIC  BP Readings from Last 3 Encounters:   11/06/19 110/68   09/12/19 114/66   03/12/19 100/68    Wt Readings from Last 3 Encounters:   11/06/19 59 kg (130 lb)   09/12/19 62.7 kg (138 lb 3.2 oz)   03/12/19 60.8 kg (134 lb)                  Patient Active Problem List   Diagnosis     DUB (dysfunctional uterine bleeding)-always--12/2018     PCOS (polycystic ovarian syndrome)--clinical signs and syptoms--12/2018     Irritable bowel syndrome with loose stools--IBS  diet,Dicyclomine--12/2018     Past Surgical History:   Procedure Laterality Date     ADENOIDECTOMY       C RAD RESEC TONSIL/PILLARS         Social History     Tobacco Use     Smoking status: Never Smoker     Smokeless tobacco: Never Used   Substance Use Topics     Alcohol use: Yes     Comment: occasionally     Family History   Problem Relation Age of Onset     Colorectal Cancer Mother      Throat cancer Father      Heart Disease Maternal Grandfather      Rheumatoid Arthritis Sister      Diabetes Other          Current Outpatient Medications   Medication Sig Dispense Refill     metFORMIN (GLUCOPHAGE-XR) 500 MG 24 hr tablet TAKE 1 TABLET BY MOUTH TWICE DAILY, WITH MEALS 60 tablet 2     norethindrone (MICRONOR) 0.35 MG tablet Take 1 tablet (0.35 mg) by mouth 2 times daily (with meals) 56 tablet 11     spironolactone (ALDACTONE) 25 MG tablet TAKE 1 TABLET BY MOUTH TWICE DAILY, WITH MEALS 60 tablet 0     dicyclomine (BENTYL) 10 MG capsule Take 1 capsule (10 mg) by mouth 4 times daily (before meals and nightly) (Patient not taking: Reported on 3/12/2019) 360 capsule 3     No Known Allergies    Mammogram not appropriate for this patient based on age.    Pertinent mammograms are reviewed under the imaging tab.  History of abnormal Pap smear: NO - age 21-29 PAP every 3 years recommended     Reviewed and updated as needed this visit by clinical staff         Reviewed and updated as needed this visit by Provider            ROS:  CONSTITUTIONAL: NEGATIVE for fever, chills, change in weight  INTEGUMENTARU/SKIN: NEGATIVE for worrisome rashes, moles or lesions  EYES: NEGATIVE for vision changes or irritation  ENT: NEGATIVE for ear, mouth and throat problems  RESP: NEGATIVE for significant cough or SOB  BREAST: NEGATIVE for masses, tenderness or discharge  CV: NEGATIVE for chest pain, palpitations or peripheral edema  GI: Hx IBS   female: usually does not have a menstrual cycle, but did a couple weeks a ago with cramping prior  "  MUSCULOSKELETAL: NEGATIVE for significant arthralgias or myalgia  NEURO: NEGATIVE for weakness, dizziness or paresthesias  ENDOCRINE: PCOS, increased fatigue  PSYCHIATRIC: NEGATIVE for changes in mood or affect    OBJECTIVE:   /68 (BP Location: Left arm, Cuff Size: Adult Regular)   Pulse 78   Temp 97.2  F (36.2  C) (Tympanic)   Resp 16   Ht 1.53 m (5' 0.25\")   Wt 59 kg (130 lb)   LMP 10/23/2019   SpO2 99%   BMI 25.18 kg/m    EXAM:  GENERAL: healthy, alert and no distress  EYES: Eyes grossly normal to inspection, PERRL and conjunctivae and sclerae normal  HENT: ear canals and TM's normal, nose and mouth without ulcers or lesions  NECK: no adenopathy, no asymmetry, masses, or scars and thyroid normal to palpation  RESP: lungs clear to auscultation - no rales, rhonchi or wheezes  CV: regular rate and rhythm, normal S1 S2, no S3 or S4, no murmur, click or rub, no peripheral edema and peripheral pulses strong  ABDOMEN: soft, nontender, no hepatosplenomegaly, no masses and bowel sounds normal   (female): normal female external genitalia, normal urethral meatus , vaginal mucosa pink, moist, well rugated, vaginal discharge - moderate, white and thick and normal cervix, adnexae, and uterus without masses.  MS: normal range of motion, slight edema to upper back/lower neck, peripheral pulses normal and tenderness to palpation upper back spine  SKIN: no suspicious lesions or rashes  NEURO: Normal strength and tone, mentation intact and speech normal  PSYCH: mentation appears normal, affect normal/bright  LYMPH: normal ant/post cervical, supraclavicular nodes      Diagnostic Test Results:  Labs reviewed in Epic  Results for orders placed or performed in visit on 11/06/19 (from the past 24 hour(s))   Wet prep   Result Value Ref Range    Specimen Description Vagina     Wet Prep No Trichomonas seen     Wet Prep No clue cells seen     Wet Prep No yeast seen     Wet Prep Few  WBC'S seen      TSH with free T4 reflex "   Result Value Ref Range    TSH 1.35 0.40 - 4.00 mU/L   CBC with platelets and differential   Result Value Ref Range    WBC 7.2 4.0 - 11.0 10e9/L    RBC Count 4.80 3.8 - 5.2 10e12/L    Hemoglobin 14.8 11.7 - 15.7 g/dL    Hematocrit 42.7 35.0 - 47.0 %    MCV 89 78 - 100 fl    MCH 30.8 26.5 - 33.0 pg    MCHC 34.7 31.5 - 36.5 g/dL    RDW 11.8 10.0 - 15.0 %    Platelet Count 278 150 - 450 10e9/L    Diff Method Automated Method     % Neutrophils 61.7 %    % Lymphocytes 29.5 %    % Monocytes 7.3 %    % Eosinophils 0.7 %    % Basophils 0.4 %    % Immature Granulocytes 0.4 %    Nucleated RBCs 0 0 /100    Absolute Neutrophil 4.5 1.6 - 8.3 10e9/L    Absolute Lymphocytes 2.1 0.8 - 5.3 10e9/L    Absolute Monocytes 0.5 0.0 - 1.3 10e9/L    Absolute Eosinophils 0.1 0.0 - 0.7 10e9/L    Absolute Basophils 0.0 0.0 - 0.2 10e9/L    Abs Immature Granulocytes 0.0 0 - 0.4 10e9/L    Absolute Nucleated RBC 0.0      XR CERVICAL SPINE 2/3 VWS  HISTORY: 24 years Female Neck pain  COMPARISON: None  TECHNIQUE: Cervical spine 5 views     FINDINGS: There is no concerning prevertebral soft tissue edema.  Alignment of the cervical spine is normal. The neuroforamen are widely  patent. Disc space height is well-maintained throughout. There is no  evidence of significant degenerative disc disease or facet  osteoarthritis.                                                                    IMPRESSION: Negative study.     LISHA ROWE MD    ASSESSMENT/PLAN:   1. Routine general medical examination at a health care facility  Continue with yearly physicals plan to notify of pap when availabe  - A pap thin layer screen reflex to HPV if ASCUS - recommend age 25 - 29  - Wet prep    2. Fatigue, unspecified type  Normal labs today  Try increasing physical activity and eating a healthy diet  Waiting on vitamin D level  - TSH with free T4 reflex  - Vitamin D Deficiency  - CBC with platelets and differential    3. Neck pain  Normal XR can continue with  "chiropractor as needed for pain  Can consider MRI and IR referral in the future or physical therapy  - XR CERVICAL SPINE 2/3 VWS (Clinic Performed); Future    4. Vaginal discharge  Noted thick vaginal drainage with PAP today normal wet prep with some WBC  Follow up if symptoms develop  - Wet prep    COUNSELING:   Reviewed preventive health counseling, as reflected in patient instructions       Regular exercise       Healthy diet/nutrition    Estimated body mass index is 25.28 kg/m  as calculated from the following:    Height as of 9/12/19: 1.575 m (5' 2\").    Weight as of 9/12/19: 62.7 kg (138 lb 3.2 oz).         reports that she has never smoked. She has never used smokeless tobacco.      Counseling Resources:  ATP IV Guidelines  Pooled Cohorts Equation Calculator  Breast Cancer Risk Calculator  FRAX Risk Assessment  ICSI Preventive Guidelines  Dietary Guidelines for Americans, 2010  USDA's MyPlate  ASA Prophylaxis  Lung CA Screening    EDWARD Byers CNP  [facility] [place]  "

## 2019-11-06 ENCOUNTER — ANCILLARY PROCEDURE (OUTPATIENT)
Dept: GENERAL RADIOLOGY | Facility: OTHER | Age: 24
End: 2019-11-06
Attending: NURSE PRACTITIONER
Payer: COMMERCIAL

## 2019-11-06 ENCOUNTER — OFFICE VISIT (OUTPATIENT)
Dept: FAMILY MEDICINE | Facility: OTHER | Age: 24
End: 2019-11-06
Attending: NURSE PRACTITIONER
Payer: COMMERCIAL

## 2019-11-06 VITALS
OXYGEN SATURATION: 99 % | HEART RATE: 78 BPM | WEIGHT: 130 LBS | BODY MASS INDEX: 25.52 KG/M2 | DIASTOLIC BLOOD PRESSURE: 68 MMHG | TEMPERATURE: 97.2 F | SYSTOLIC BLOOD PRESSURE: 110 MMHG | RESPIRATION RATE: 16 BRPM | HEIGHT: 60 IN

## 2019-11-06 DIAGNOSIS — N89.8 VAGINAL DISCHARGE: ICD-10-CM

## 2019-11-06 DIAGNOSIS — M54.2 NECK PAIN: ICD-10-CM

## 2019-11-06 DIAGNOSIS — R53.83 FATIGUE, UNSPECIFIED TYPE: ICD-10-CM

## 2019-11-06 DIAGNOSIS — Z00.00 ROUTINE GENERAL MEDICAL EXAMINATION AT A HEALTH CARE FACILITY: Primary | ICD-10-CM

## 2019-11-06 LAB
BASOPHILS # BLD AUTO: 0 10E9/L (ref 0–0.2)
BASOPHILS NFR BLD AUTO: 0.4 %
DIFFERENTIAL METHOD BLD: NORMAL
EOSINOPHIL # BLD AUTO: 0.1 10E9/L (ref 0–0.7)
EOSINOPHIL NFR BLD AUTO: 0.7 %
ERYTHROCYTE [DISTWIDTH] IN BLOOD BY AUTOMATED COUNT: 11.8 % (ref 10–15)
HCT VFR BLD AUTO: 42.7 % (ref 35–47)
HGB BLD-MCNC: 14.8 G/DL (ref 11.7–15.7)
IMM GRANULOCYTES # BLD: 0 10E9/L (ref 0–0.4)
IMM GRANULOCYTES NFR BLD: 0.4 %
LYMPHOCYTES # BLD AUTO: 2.1 10E9/L (ref 0.8–5.3)
LYMPHOCYTES NFR BLD AUTO: 29.5 %
MCH RBC QN AUTO: 30.8 PG (ref 26.5–33)
MCHC RBC AUTO-ENTMCNC: 34.7 G/DL (ref 31.5–36.5)
MCV RBC AUTO: 89 FL (ref 78–100)
MONOCYTES # BLD AUTO: 0.5 10E9/L (ref 0–1.3)
MONOCYTES NFR BLD AUTO: 7.3 %
NEUTROPHILS # BLD AUTO: 4.5 10E9/L (ref 1.6–8.3)
NEUTROPHILS NFR BLD AUTO: 61.7 %
NRBC # BLD AUTO: 0 10*3/UL
NRBC BLD AUTO-RTO: 0 /100
PLATELET # BLD AUTO: 278 10E9/L (ref 150–450)
RBC # BLD AUTO: 4.8 10E12/L (ref 3.8–5.2)
SPECIMEN SOURCE: NORMAL
TSH SERPL DL<=0.005 MIU/L-ACNC: 1.35 MU/L (ref 0.4–4)
WBC # BLD AUTO: 7.2 10E9/L (ref 4–11)
WET PREP SPEC: NORMAL

## 2019-11-06 PROCEDURE — 90632 HEPA VACCINE ADULT IM: CPT | Performed by: NURSE PRACTITIONER

## 2019-11-06 PROCEDURE — 36415 COLL VENOUS BLD VENIPUNCTURE: CPT | Performed by: NURSE PRACTITIONER

## 2019-11-06 PROCEDURE — 72040 X-RAY EXAM NECK SPINE 2-3 VW: CPT | Mod: TC

## 2019-11-06 PROCEDURE — 82306 VITAMIN D 25 HYDROXY: CPT | Performed by: NURSE PRACTITIONER

## 2019-11-06 PROCEDURE — G0123 SCREEN CERV/VAG THIN LAYER: HCPCS | Performed by: NURSE PRACTITIONER

## 2019-11-06 PROCEDURE — 84443 ASSAY THYROID STIM HORMONE: CPT | Performed by: NURSE PRACTITIONER

## 2019-11-06 PROCEDURE — 99395 PREV VISIT EST AGE 18-39: CPT | Mod: 25 | Performed by: NURSE PRACTITIONER

## 2019-11-06 PROCEDURE — 90471 IMMUNIZATION ADMIN: CPT | Performed by: NURSE PRACTITIONER

## 2019-11-06 PROCEDURE — 87210 SMEAR WET MOUNT SALINE/INK: CPT | Performed by: NURSE PRACTITIONER

## 2019-11-06 PROCEDURE — 85025 COMPLETE CBC W/AUTO DIFF WBC: CPT | Performed by: NURSE PRACTITIONER

## 2019-11-06 ASSESSMENT — PAIN SCALES - GENERAL: PAINLEVEL: NO PAIN (0)

## 2019-11-06 ASSESSMENT — MIFFLIN-ST. JEOR: SCORE: 1260.15

## 2019-11-08 LAB — DEPRECATED CALCIDIOL+CALCIFEROL SERPL-MC: 32 UG/L (ref 20–75)

## 2019-11-12 LAB
COPATH REPORT: NORMAL
PAP: NORMAL

## 2019-11-15 ENCOUNTER — MYC MEDICAL ADVICE (OUTPATIENT)
Dept: OBGYN | Facility: OTHER | Age: 24
End: 2019-11-15

## 2019-11-15 DIAGNOSIS — E28.2 PCOS (POLYCYSTIC OVARIAN SYNDROME): ICD-10-CM

## 2019-11-15 RX ORDER — METFORMIN HCL 500 MG
TABLET, EXTENDED RELEASE 24 HR ORAL
Qty: 60 TABLET | Refills: 2 | Status: SHIPPED | OUTPATIENT
Start: 2019-11-15 | End: 2020-08-05

## 2019-11-19 ENCOUNTER — MYC MEDICAL ADVICE (OUTPATIENT)
Dept: OBGYN | Facility: OTHER | Age: 24
End: 2019-11-19

## 2020-01-06 DIAGNOSIS — E28.2 PCOS (POLYCYSTIC OVARIAN SYNDROME): ICD-10-CM

## 2020-01-08 RX ORDER — SPIRONOLACTONE 25 MG/1
TABLET ORAL
Qty: 60 TABLET | Refills: 0 | Status: SHIPPED | OUTPATIENT
Start: 2020-01-08 | End: 2020-02-06

## 2020-01-08 NOTE — TELEPHONE ENCOUNTER
Due for labs per protocol.    Potassium   Date Value Ref Range Status   12/19/2018 3.9 3.4 - 5.3 mmol/L Final     Lab Results   Component Value Date    CR 0.73 12/19/2018     spironolactone (ALDACTONE) 25 MG tablet      Last Written Prescription Date:  12/5/19  Last Fill Quantity: 60,   # refills: 0  Last Office Visit: 11/6/19  Future Office visit:       Routing refill request to provider for review/approval because:  Drug not on the [facility] refill protocol or controlled substance

## 2020-01-10 ENCOUNTER — MYC REFILL (OUTPATIENT)
Dept: OBGYN | Facility: OTHER | Age: 25
End: 2020-01-10

## 2020-01-10 DIAGNOSIS — E28.2 PCOS (POLYCYSTIC OVARIAN SYNDROME): ICD-10-CM

## 2020-01-14 RX ORDER — ACETAMINOPHEN AND CODEINE PHOSPHATE 120; 12 MG/5ML; MG/5ML
0.35 SOLUTION ORAL 2 TIMES DAILY WITH MEALS
Qty: 56 TABLET | Refills: 11 | OUTPATIENT
Start: 2020-01-14

## 2020-01-22 ENCOUNTER — OFFICE VISIT (OUTPATIENT)
Dept: FAMILY MEDICINE | Facility: OTHER | Age: 25
End: 2020-01-22
Attending: NURSE PRACTITIONER
Payer: COMMERCIAL

## 2020-01-22 VITALS
BODY MASS INDEX: 25.76 KG/M2 | OXYGEN SATURATION: 98 % | HEART RATE: 92 BPM | DIASTOLIC BLOOD PRESSURE: 70 MMHG | RESPIRATION RATE: 16 BRPM | WEIGHT: 133 LBS | TEMPERATURE: 98.9 F | SYSTOLIC BLOOD PRESSURE: 120 MMHG

## 2020-01-22 DIAGNOSIS — L72.9 CYST OF SKIN: Primary | ICD-10-CM

## 2020-01-22 PROCEDURE — 99213 OFFICE O/P EST LOW 20 MIN: CPT | Performed by: NURSE PRACTITIONER

## 2020-01-22 RX ORDER — DOXYCYCLINE 100 MG/1
100 CAPSULE ORAL 2 TIMES DAILY
Qty: 20 CAPSULE | Refills: 0 | Status: SHIPPED | OUTPATIENT
Start: 2020-01-22 | End: 2020-02-18

## 2020-01-22 ASSESSMENT — PAIN SCALES - GENERAL: PAINLEVEL: NO PAIN (0)

## 2020-01-22 NOTE — NURSING NOTE
"Chief Complaint   Patient presents with     Derm Problem       Initial /70   Pulse 92   Temp 98.9  F (37.2  C) (Tympanic)   Resp 16   Wt 60.3 kg (133 lb)   SpO2 98%   BMI 25.76 kg/m   Estimated body mass index is 25.76 kg/m  as calculated from the following:    Height as of 11/6/19: 1.53 m (5' 0.25\").    Weight as of this encounter: 60.3 kg (133 lb).  Medication Reconciliation: complete  Madeleine Valenzuela LPN    "

## 2020-01-22 NOTE — PROGRESS NOTES
Subjective     Obie Norris is a 24 year old female who presents to clinic today for the following health issues:    HPI   Rash      Duration: 2 day    Description  Location: chin   Itching: no    Intensity:  moderate    Accompanying signs and symptoms: slightly painful and swollen lymph nod    History (similar episodes/previous evaluation): yes several times a year    Precipitating or alleviating factors:  New exposures:  None  Recent travel: no      Therapies tried and outcome: none    States she has had less incidents since changing her face wash    Has used doxycycline in the past with good results            Patient Active Problem List   Diagnosis     DUB (dysfunctional uterine bleeding)-always--12/2018     PCOS (polycystic ovarian syndrome)--clinical signs and syptoms--12/2018     Irritable bowel syndrome with loose stools--IBS diet,Dicyclomine--12/2018     Past Surgical History:   Procedure Laterality Date     ADENOIDECTOMY       C RAD RESEC TONSIL/PILLARS         Social History     Tobacco Use     Smoking status: Never Smoker     Smokeless tobacco: Never Used   Substance Use Topics     Alcohol use: Yes     Comment: occasionally     Family History   Problem Relation Age of Onset     Colorectal Cancer Mother      Throat cancer Father      Heart Disease Maternal Grandfather      Rheumatoid Arthritis Sister      Diabetes Other          Current Outpatient Medications   Medication Sig Dispense Refill     metFORMIN (GLUCOPHAGE-XR) 500 MG 24 hr tablet TAKE 1 TABLET BY MOUTH TWICE DAILY, WITH MEALS 60 tablet 2     norethindrone (MICRONOR) 0.35 MG tablet Take 1 tablet (0.35 mg) by mouth 2 times daily (with meals) 56 tablet 11     spironolactone (ALDACTONE) 25 MG tablet TAKE 1 TABLET BY MOUTH TWICE DAILY, WITH MEALS 60 tablet 0     No Known Allergies  BP Readings from Last 3 Encounters:   01/22/20 120/70   11/06/19 110/68   09/12/19 114/66    Wt Readings from Last 3 Encounters:   01/22/20 60.3 kg (133 lb)   11/06/19  "59 kg (130 lb)   09/12/19 62.7 kg (138 lb 3.2 oz)                  Reviewed and updated as needed this visit by Provider         Review of Systems   ROS COMP: CONSTITUTIONAL: NEGATIVE for fever, chills, change in weight  INTEGUMENTARY/SKIN: cyst on face and race  RESP: NEGATIVE for significant cough or SOB  CV: NEGATIVE for chest pain, palpitations or peripheral edema      Objective    /70   Pulse 92   Temp 98.9  F (37.2  C) (Tympanic)   Resp 16   Wt 60.3 kg (133 lb)   SpO2 98%   BMI 25.76 kg/m    Body mass index is 25.76 kg/m .  Physical Exam   GENERAL: healthy, alert and no distress  RESP: lungs clear to auscultation - no rales, rhonchi or wheezes  CV: regular rate and rhythm, normal S1 S2, no S3 or S4, no murmur, click or rub, no peripheral edema and peripheral pulses strong  SKIN: see picture below: rash macular/papular to face, chin erythema patch with open area, no drainage at this time. Under chin (submandibular area) firm tender node          Diagnostic Test Results:  none         Assessment & Plan     1. Cyst of skin  Treatment with antibiotic and referral to derm for recurrent cyst to face and swollen submandibular area   - doxycycline hyclate (VIBRAMYCIN) 100 MG capsule; Take 1 capsule (100 mg) by mouth 2 times daily for 10 days  Dispense: 20 capsule; Refill: 0  - DERMATOLOGY REFERRAL      1/24/2020  Cyst on chin clearing, but has developed additional cyst on her face. Plan to try Bactroban ointment if continues to have breakouts may need to switch antibiotic to Bactrim      BMI:   Estimated body mass index is 25.76 kg/m  as calculated from the following:    Height as of 11/6/19: 1.53 m (5' 0.25\").    Weight as of this encounter: 60.3 kg (133 lb).           See Patient Instructions    No follow-ups on file.    EDWARD Byers [facility] [place]      "

## 2020-01-24 RX ORDER — MUPIROCIN 20 MG/G
OINTMENT TOPICAL 3 TIMES DAILY
Qty: 22 G | Refills: 1 | Status: SHIPPED | OUTPATIENT
Start: 2020-01-24 | End: 2021-02-12

## 2020-02-18 ENCOUNTER — OFFICE VISIT (OUTPATIENT)
Dept: DERMATOLOGY | Facility: OTHER | Age: 25
End: 2020-02-18
Attending: NURSE PRACTITIONER
Payer: COMMERCIAL

## 2020-02-18 VITALS
HEART RATE: 87 BPM | HEIGHT: 62 IN | OXYGEN SATURATION: 99 % | DIASTOLIC BLOOD PRESSURE: 60 MMHG | TEMPERATURE: 98.6 F | BODY MASS INDEX: 24.8 KG/M2 | WEIGHT: 134.8 LBS | SYSTOLIC BLOOD PRESSURE: 110 MMHG

## 2020-02-18 DIAGNOSIS — L73.8 BACTERIAL FOLLICULITIS: Primary | ICD-10-CM

## 2020-02-18 PROCEDURE — 99202 OFFICE O/P NEW SF 15 MIN: CPT | Performed by: DERMATOLOGY

## 2020-02-18 ASSESSMENT — PAIN SCALES - GENERAL: PAINLEVEL: NO PAIN (0)

## 2020-02-18 ASSESSMENT — MIFFLIN-ST. JEOR: SCORE: 1309.7

## 2020-02-18 NOTE — PATIENT INSTRUCTIONS
It appears from the photo that you are having episodes of bacterial folliculitis leading to the cysts which you describe. Would purchase some Hibiclens and dilute it 1:3 with tapwater. Cleanse you face morning and night with this product to see if it prevents the facial lesions.    Would also apply the Bactroban to the lower face - tiny amount- and also apply this into your nostrils where there may be a reservoir of bacteria.      A bacterial culture of the next cyst or boil would be the best thing to do to find the organism causing the cysts.

## 2020-02-18 NOTE — NURSING NOTE
"Chief Complaint   Patient presents with     Derm Problem       Initial /60   Pulse 87   Temp 98.6  F (37  C)   Ht 1.575 m (5' 2\")   Wt 61.1 kg (134 lb 12.8 oz)   SpO2 99%   BMI 24.66 kg/m   Estimated body mass index is 24.66 kg/m  as calculated from the following:    Height as of this encounter: 1.575 m (5' 2\").    Weight as of this encounter: 61.1 kg (134 lb 12.8 oz).  Medication Reconciliation: complete  Debbie Pavon LPN    "

## 2020-02-18 NOTE — LETTER
"2020       RE: Obie Norris  [address]  [place] [address]     Dear Colleague,    Thank you for referring your patient, Obie Norris, to the [facility]. Please see a copy of my visit note below.    Visit Date:   2020      SUBJECTIVE:  Obie comes in stating that she has been having \"cysts\" on her face over the last several months.  This has resulted in some very nasty looking inflamed lesions.  She has had them apparently lanced on occasion or treated with oral antibiotics, but they keep coming.      OBJECTIVE:  Shows a young lady in no acute distress, but quite unhappy about the fact that she is having these facial lesions.  On exam, her face does show some acneiform lesions.  From a  Phone photo , I do notice a lesion that appeared to be a small abscess or bacterial induced lesion.  I saw no true cysts.      ASSESSMENT:  I am wondering whether we are dealing with a MRSA infection with these  recurrences.  She denies any irritation in her nose, which sometimes accompanies folliculitis.  I recommend the followin.  That she continue her Bactroban on a preventive basis rather than only when she gets a lesion.     2.  Most importantly, if she does get a pustular lesion, I would suggest that it be cultured for bacteria to see if there is bacteria present.   If that becomes the case, then I believe a prolonged treatment with oral antibiotics might be necessary.      I did not see any scars today.  She denies any lesions elsewhere on the body, mainly the face.  I did suggest that she purchase some Hibiclens surgical cleanser, dilute this 1 part Hibiclens to 2 or 3 parts of water, then gently wash her face with this morning and night - if tolerated and not too drying.       I think cultures are the key here to see if there is a specific bacteria that is recurring and causing some of her troubles.  I also suggest that " she put the Bactroban in her nasal cavities as a preventive measure.      MEDICATIONS AND ALLERGIES:  Reviewed.      Return to see me or call us and contact my assistant, Annie Hobbs, if there are further troubles in the upcoming months.         SKYLER FAIRCHILD MD             D: 2020   T: 2020   MT: ALEK      Name:     QUYEN MENDENHALL   MRN:      [id number]        Account:      [id number]   :      1995           Visit Date:   2020      Document: [id number]        Again, thank you for allowing me to participate in the care of your patient.      Sincerely,    SKYLER Fairchild MD

## 2020-02-19 NOTE — PROGRESS NOTES
"Visit Date:   2020      SUBJECTIVE:  Obie comes in stating that she has been having \"cysts\" on her face over the last several months.  This has resulted in some very nasty looking inflamed lesions.  She has had them apparently lanced on occasion or treated with oral antibiotics, but they keep coming.      OBJECTIVE:  Shows a young lady in no acute distress, but quite unhappy about the fact that she is having these facial lesions.  On exam, her face does show some acneiform lesions.  From a  Phone photo , I do notice a lesion that appeared to be a small abscess or bacterial induced lesion.  I saw no true cysts.      ASSESSMENT:  I am wondering whether we are dealing with a MRSA infection with these  recurrences.  She denies any irritation in her nose, which sometimes accompanies folliculitis.  I recommend the followin.  That she continue her Bactroban on a preventive basis rather than only when she gets a lesion.     2.  Most importantly, if she does get a pustular lesion, I would suggest that it be cultured for bacteria to see if there is bacteria present.   If that becomes the case, then I believe a prolonged treatment with oral antibiotics might be necessary.      I did not see any scars today.  She denies any lesions elsewhere on the body, mainly the face.  I did suggest that she purchase some Hibiclens surgical cleanser, dilute this 1 part Hibiclens to 2 or 3 parts of water, then gently wash her face with this morning and night - if tolerated and not too drying.       I think cultures are the key here to see if there is a specific bacteria that is recurring and causing some of her troubles.  I also suggest that she put the Bactroban in her nasal cavities as a preventive measure.      MEDICATIONS AND ALLERGIES:  Reviewed.      Return to see me or call us and contact my assistant, Annie Hobbs, if there are further troubles in the upcoming months.         H HOLLIS FAIRCHILD MD             D: " 2020   T: 2020   MT: ALEK      Name:     QUYEN MENDENHALL   MRN:      [id number]        Account:      [id number]   :      1995           Visit Date:   2020      Document: [id number]

## 2020-03-04 ENCOUNTER — OFFICE VISIT (OUTPATIENT)
Dept: FAMILY MEDICINE | Facility: OTHER | Age: 25
End: 2020-03-04
Attending: NURSE PRACTITIONER
Payer: COMMERCIAL

## 2020-03-04 VITALS
RESPIRATION RATE: 16 BRPM | DIASTOLIC BLOOD PRESSURE: 60 MMHG | BODY MASS INDEX: 23.59 KG/M2 | OXYGEN SATURATION: 98 % | TEMPERATURE: 96.9 F | SYSTOLIC BLOOD PRESSURE: 100 MMHG | HEART RATE: 87 BPM | WEIGHT: 129 LBS

## 2020-03-04 DIAGNOSIS — R14.3 EXCESSIVE FLATUS: Primary | ICD-10-CM

## 2020-03-04 DIAGNOSIS — Z80.0 FAMILY HISTORY OF COLON CANCER: ICD-10-CM

## 2020-03-04 DIAGNOSIS — R10.84 ABDOMINAL PAIN, GENERALIZED: ICD-10-CM

## 2020-03-04 PROCEDURE — 99213 OFFICE O/P EST LOW 20 MIN: CPT | Performed by: NURSE PRACTITIONER

## 2020-03-04 ASSESSMENT — PAIN SCALES - GENERAL: PAINLEVEL: NO PAIN (0)

## 2020-03-04 NOTE — PROGRESS NOTES
Carlee Norris is a 25 year old female who presents to clinic today for the following health issues:    HPI   Irregular bowl movements      Duration: several years    Description (location/character/radiation): bowls are irregular     Intensity:  moderate    Accompanying signs and symptoms: severe gas and loud sounds in stomach     History (similar episodes/previous evaluation): None does have a family hx of colon cancer ( mother)     Precipitating or alleviating factors: None    Therapies tried and outcome: diet changes and fiber and did not help    Family history colon cancer mom was diagnosed with stage 4 at age 56    She has tried multiple eliminations diet.     She has frequent bowl movement with eating and throughout the day    Stool is always different shape, from liquid to formed and multiple different colors          Patient Active Problem List   Diagnosis     DUB (dysfunctional uterine bleeding)-always--12/2018     PCOS (polycystic ovarian syndrome)--clinical signs and syptoms--12/2018     Irritable bowel syndrome with loose stools--IBS diet,Dicyclomine--12/2018     Past Surgical History:   Procedure Laterality Date     ADENOIDECTOMY       C RAD RESEC TONSIL/PILLARS         Social History     Tobacco Use     Smoking status: Never Smoker     Smokeless tobacco: Never Used   Substance Use Topics     Alcohol use: Yes     Comment: occasionally     Family History   Problem Relation Age of Onset     Colorectal Cancer Mother      Throat cancer Father      Heart Disease Maternal Grandfather      Rheumatoid Arthritis Sister      Diabetes Other          Current Outpatient Medications   Medication Sig Dispense Refill     metFORMIN (GLUCOPHAGE-XR) 500 MG 24 hr tablet TAKE 1 TABLET BY MOUTH TWICE DAILY, WITH MEALS 60 tablet 2     mupirocin (BACTROBAN) 2 % external ointment Apply topically 3 times daily (Patient not taking: Reported on 2/18/2020) 22 g 1     norethindrone (MICRONOR) 0.35 MG tablet Take 1  tablet (0.35 mg) by mouth 2 times daily (with meals) 56 tablet 11     spironolactone (ALDACTONE) 25 MG tablet TAKE 1 TABLET BY MOUTH TWICE DAILY, WITH MEALS 60 tablet 0     No Known Allergies  BP Readings from Last 3 Encounters:   03/04/20 100/60   02/18/20 110/60   01/22/20 120/70    Wt Readings from Last 3 Encounters:   03/04/20 58.5 kg (129 lb)   02/18/20 61.1 kg (134 lb 12.8 oz)   01/22/20 60.3 kg (133 lb)                 Reviewed and updated as needed this visit by Provider         Review of Systems   ROS COMP: CONSTITUTIONAL: NEGATIVE for fever, chills, change in weight  INTEGUMENTARY/SKIN: NEGATIVE for worrisome rashes, moles or lesions  RESP: NEGATIVE for significant cough or SOB  CV: NEGATIVE for chest pain, palpitations or peripheral edema  GI: abdominal pain generalized, constipation, diarrhea and gas or bloating  : denies dysuria   NEURO: NEGATIVE for weakness, dizziness or paresthesias      Objective    /60   Pulse 87   Temp 96.9  F (36.1  C) (Tympanic)   Resp 16   Wt 58.5 kg (129 lb)   SpO2 98%   BMI 23.59 kg/m    Body mass index is 23.59 kg/m .  Physical Exam   GENERAL: alert and no distress  HENT: ear canals and TM's normal, nose and mouth without ulcers or lesions  NECK: no adenopathy, no asymmetry, masses, or scars and thyroid normal to palpation  RESP: lungs clear to auscultation - no rales, rhonchi or wheezes  CV: regular rate and rhythm, normal S1 S2, no S3 or S4, no murmur, click or rub, no peripheral edema and peripheral pulses strong  ABDOMEN: soft, nontender, without hepatosplenomegaly or masses, bowel sounds normal and bloated   SKIN: no suspicious lesions or rashes  PSYCH: mentation appears normal, affect normal/bright  LYMPH: no cervical adenopathy    Diagnostic Test Results:  Labs reviewed in Epic        Assessment & Plan     1. Excessive flatus  With chronic symptoms and no change in bowel habits, excessive gas, family history of colon cancer plan for referral to  GI.  Symptomatic treatment with change in diet did not improve symptoms  - GASTROENTEROLOGY ADULT REF CONSULT ONLY    2. Abdominal pain, generalized  As above  - GASTROENTEROLOGY ADULT REF CONSULT ONLY    3. Family history of colon cancer  As above  - GASTROENTEROLOGY ADULT REF CONSULT ONLY       See Patient Instructions    No follow-ups on file.    EDWARD Byers CNP  [facility]

## 2020-03-17 ENCOUNTER — MYC REFILL (OUTPATIENT)
Dept: OBGYN | Facility: OTHER | Age: 25
End: 2020-03-17

## 2020-03-17 DIAGNOSIS — E28.2 PCOS (POLYCYSTIC OVARIAN SYNDROME): ICD-10-CM

## 2020-03-18 NOTE — TELEPHONE ENCOUNTER
Spironolactone  Last Written Prescription Date: 2/7/20  Last Fill Quantity: 60 # of Refills: 0  Last Office Visit: 3/4/20

## 2020-03-19 RX ORDER — SPIRONOLACTONE 25 MG/1
TABLET ORAL
Qty: 60 TABLET | Refills: 0 | Status: SHIPPED | OUTPATIENT
Start: 2020-03-19 | End: 2020-04-22

## 2020-03-19 NOTE — TELEPHONE ENCOUNTER
spironolactone (ALDACTONE) 25 MG tablet       Diuretics (Including Combos) Protocol Failed due to:     No Normal serum creatinine on file in past 12 months    Creatinine   Date Value Ref Range Status   12/19/2018 0.73 0.52 - 1.04 mg/dL Final     No Normal serum potassium on file in past 12 months    Potassium   Date Value Ref Range Status   12/19/2018 3.9 3.4 - 5.3 mmol/L Final     Normal serum sodium on file in past 12 months    Sodium   Date Value Ref Range Status   12/19/2018 137 133 - 144 mmol/L Final         PCP: Jesenia Pavon APRN CNP

## 2020-04-22 ENCOUNTER — MYC REFILL (OUTPATIENT)
Dept: OBGYN | Facility: OTHER | Age: 25
End: 2020-04-22

## 2020-04-22 DIAGNOSIS — E28.2 PCOS (POLYCYSTIC OVARIAN SYNDROME): ICD-10-CM

## 2020-04-22 RX ORDER — SPIRONOLACTONE 25 MG/1
TABLET ORAL
Qty: 60 TABLET | Refills: 0 | Status: SHIPPED | OUTPATIENT
Start: 2020-04-22 | End: 2020-06-02

## 2020-04-22 NOTE — TELEPHONE ENCOUNTER
Due for labs.    Creatinine   Date Value Ref Range Status   12/19/2018 0.73 0.52 - 1.04 mg/dL Final     Potassium   Date Value Ref Range Status   12/19/2018 3.9 3.4 - 5.3 mmol/L Final     Sodium   Date Value Ref Range Status   12/19/2018 137 133 - 144 mmol/L Final       spironolactone (ALDACTONE) 25 MG tablet       Last Written Prescription Date:  3/19/20  Last Fill Quantity: 60,   # refills: 0  Last Office Visit: 1/22/20  Future Office visit:       Routing refill request to provider for review/approval because:  Drug not on the [facility], [facility] or [facility] refill protocol or controlled substance

## 2020-04-28 ENCOUNTER — TRANSFERRED RECORDS (OUTPATIENT)
Dept: HEALTH INFORMATION MANAGEMENT | Facility: CLINIC | Age: 25
End: 2020-04-28

## 2020-04-28 DIAGNOSIS — R10.32 LLQ ABDOMINAL PAIN: Primary | ICD-10-CM

## 2020-04-28 DIAGNOSIS — R19.7 DIARRHEA: ICD-10-CM

## 2020-04-30 DIAGNOSIS — R10.32 LLQ ABDOMINAL PAIN: ICD-10-CM

## 2020-04-30 DIAGNOSIS — R19.7 DIARRHEA: ICD-10-CM

## 2020-04-30 PROCEDURE — 83516 IMMUNOASSAY NONANTIBODY: CPT | Performed by: INTERNAL MEDICINE

## 2020-04-30 PROCEDURE — 36415 COLL VENOUS BLD VENIPUNCTURE: CPT | Performed by: INTERNAL MEDICINE

## 2020-04-30 PROCEDURE — 83516 IMMUNOASSAY NONANTIBODY: CPT | Mod: 59 | Performed by: INTERNAL MEDICINE

## 2020-05-01 LAB
TTG IGA SER-ACNC: <1 U/ML
TTG IGG SER-ACNC: <1 U/ML

## 2020-05-08 ENCOUNTER — ALLIED HEALTH/NURSE VISIT (OUTPATIENT)
Dept: FAMILY MEDICINE | Facility: OTHER | Age: 25
End: 2020-05-08
Attending: INTERNAL MEDICINE
Payer: COMMERCIAL

## 2020-05-08 DIAGNOSIS — R19.7 DIARRHEA: ICD-10-CM

## 2020-05-08 DIAGNOSIS — Z23 NEED FOR VACCINATION: Primary | ICD-10-CM

## 2020-05-08 DIAGNOSIS — R10.32 LLQ ABDOMINAL PAIN: ICD-10-CM

## 2020-05-08 LAB — LACTOFERRIN STL QL IA: NEGATIVE

## 2020-05-08 PROCEDURE — 83630 LACTOFERRIN FECAL (QUAL): CPT | Performed by: INTERNAL MEDICINE

## 2020-05-08 PROCEDURE — 90471 IMMUNIZATION ADMIN: CPT

## 2020-05-08 PROCEDURE — 90632 HEPA VACCINE ADULT IM: CPT

## 2020-05-18 ENCOUNTER — MYC MEDICAL ADVICE (OUTPATIENT)
Dept: FAMILY MEDICINE | Facility: OTHER | Age: 25
End: 2020-05-18

## 2020-06-03 DIAGNOSIS — E28.2 PCOS (POLYCYSTIC OVARIAN SYNDROME): ICD-10-CM

## 2020-06-04 RX ORDER — SPIRONOLACTONE 25 MG/1
TABLET ORAL
Qty: 60 TABLET | Refills: 0 | OUTPATIENT
Start: 2020-06-04

## 2020-07-08 DIAGNOSIS — E28.2 PCOS (POLYCYSTIC OVARIAN SYNDROME): ICD-10-CM

## 2020-07-08 LAB
ANION GAP SERPL CALCULATED.3IONS-SCNC: <1 MMOL/L (ref 3–14)
BUN SERPL-MCNC: 18 MG/DL (ref 7–30)
CALCIUM SERPL-MCNC: 9.2 MG/DL (ref 8.5–10.1)
CHLORIDE SERPL-SCNC: 103 MMOL/L (ref 94–109)
CO2 SERPL-SCNC: 32 MMOL/L (ref 20–32)
CREAT SERPL-MCNC: 0.71 MG/DL (ref 0.52–1.04)
GFR SERPL CREATININE-BSD FRML MDRD: >90 ML/MIN/{1.73_M2}
GLUCOSE SERPL-MCNC: 79 MG/DL (ref 70–99)
POTASSIUM SERPL-SCNC: 3.8 MMOL/L (ref 3.4–5.3)
SODIUM SERPL-SCNC: 135 MMOL/L (ref 133–144)

## 2020-07-08 PROCEDURE — 36415 COLL VENOUS BLD VENIPUNCTURE: CPT | Performed by: FAMILY MEDICINE

## 2020-07-08 PROCEDURE — 80048 BASIC METABOLIC PNL TOTAL CA: CPT | Performed by: FAMILY MEDICINE

## 2020-07-13 DIAGNOSIS — B00.9 HSV (HERPES SIMPLEX VIRUS) INFECTION: Primary | ICD-10-CM

## 2020-07-13 RX ORDER — VALACYCLOVIR HYDROCHLORIDE 500 MG/1
500 TABLET, FILM COATED ORAL 2 TIMES DAILY
Qty: 6 TABLET | Refills: 3 | Status: SHIPPED | OUTPATIENT
Start: 2020-07-13 | End: 2021-02-12

## 2020-07-29 ENCOUNTER — RESULTS ONLY (OUTPATIENT)
Dept: LAB | Age: 25
End: 2020-07-29

## 2020-07-29 ENCOUNTER — ALLIED HEALTH/NURSE VISIT (OUTPATIENT)
Dept: FAMILY MEDICINE | Facility: OTHER | Age: 25
End: 2020-07-29
Attending: NURSE PRACTITIONER
Payer: COMMERCIAL

## 2020-07-29 DIAGNOSIS — J02.9 PHARYNGITIS: Primary | ICD-10-CM

## 2020-07-29 PROCEDURE — 99207 ZZC NO CHARGE NURSE ONLY: CPT

## 2020-07-30 ENCOUNTER — MYC MEDICAL ADVICE (OUTPATIENT)
Dept: FAMILY MEDICINE | Facility: OTHER | Age: 25
End: 2020-07-30

## 2020-07-30 LAB
SARS-COV-2 RNA SPEC QL NAA+PROBE: NOT DETECTED
SPECIMEN SOURCE: NORMAL

## 2020-07-31 NOTE — TELEPHONE ENCOUNTER
If you can tolerate them I would suggest keep taking them, but if you do vomit again you can stop for awhile.  You just want to make sure you do not get pregnant before restarting     Jesenia LEON FNP-BC  Family Nurse Practitioner

## 2020-07-31 NOTE — TELEPHONE ENCOUNTER
If you would like.  I can put a referral through.  They will follow with you until you get your results or feel better. I would be up to you.  Other wise you can monitor your symptoms at home and let me know if anything changes.  I am working UC today and Sunday you can always call here and ask to talk to me.      Jesenia LEON FNP-BC  Family Nurse Practitioner

## 2020-07-31 NOTE — TELEPHONE ENCOUNTER
Ok   That's what I was thinking.  Let me know if you need anything     Jesenia LEON FNP-BC  Family Nurse Practitioner     medulla, cervical cord at level of C2 and C4 vertebral bodies, at level of C2-C3, C5-C6 and C6-C7 disks as well as small enhancing MS plaque in posterior aspect of the cord at level of T1-T2 disc; also mild broad-based disc protrusions at C5-C6 and C6-C7 about the anterior contour of the cord and lead to mild spinal canal stenosis. MRI cervical spine August 2017 revealed T2 hyperintense lesions which appear similar to prior exam with no new lesions or associated enhancement but degenerative changes contributing to minimal spinal canal stenosis at C6-C7. Review of systems done by staff reviewed and pertinent positives include balance difficulties, weakness, numbness, constipation, fatigue, etc. as stated above. Current Outpatient Medications on File Prior to Visit   Medication Sig Dispense Refill    lisinopril (PRINIVIL;ZESTRIL) 10 MG tablet Take 10 mg by mouth daily      magnesium 200 MG TABS tablet Take 200 mg by mouth daily      COD LIVER OIL PO Take by mouth daily      lactulose 20 GM/30ML SOLN Take by mouth      vitamin D (CHOLECALCIFEROL) 32855 UNIT CAPS Take 1 capsule by mouth once a week 8 capsule 0    vitamin D 1000 units CAPS Take one cap daily starting Jan 9th 2019 30 capsule 11    oxybutynin (DITROPAN-XL) 10 MG extended release tablet TAKE 1 TABLET DAILY 90 tablet 3    Interferon Beta-1a (AVONEX PEN) 30 MCG/0.5ML AJKT INJECT 0.5 ML INTRAMUSCULARLY ONCE A WEEK 1 each 6    venlafaxine (EFFEXOR XR) 150 MG extended release capsule TAKE 1 CAPSULE DAILY 90 capsule 0    levothyroxine (SYNTHROID) 150 MCG tablet Take 1 tablet by mouth daily.  Cranberry 250 MG TABS Take  by mouth 2 times daily. Takes Cranactin from [facility] [place] ordered by urology       No current facility-administered medications on file prior to visit. Allergies: [facility] has No Known Allergies.     Past Medical History:   Diagnosis Date    Caffeine use     1 coffee/day    Constipation     Depression, major, recurrent ([facility]     Treated with EFFEXOR.  Diverticulitis     Hypertension     Hypothyroid 1996    Thyroid cancer surgically removed. Treated with LEVOXYL.  Irregular menses     resolved    Migraine with aura     Remote problem    Mononucleosis 1971    Caused Erythema Nodosum    Multiple sclerosis ([facility]     Osteoporosis     Treated with FOSAMAX.  Relapsing remitting multiple sclerosis (HCC)     Thyroid cancer ([facility]     Urinary incontinence     Urinary retention     Vaginal atrophy        Past Surgical History:   Procedure Laterality Date    COLONOSCOPY      DILATION AND CURETTAGE  2/2005    with h/s    THYROIDECTOMY      Removed in 2 surgeries due to cancer.  TONSILLECTOMY       Social History: Rox Ellison  reports that she has never smoked. She has never used smokeless tobacco. She reports that she drinks alcohol. She reports that she does not use drugs. Family History   Problem Relation Age of Onset    Other Father         stroke, Diabetes    Other Mother         Thyroid disease    Other Maternal Grandmother         Colon Ca    Other Maternal Grandfather         Colon Ca       On exam: Blood pressure 136/79, pulse 66, height 5' 8\" (1.727 m), weight 138 lb 3.2 oz (62.7 kg), not currently breastfeeding.     GENERAL  Appears comfortable and in no distress   HEENT  NC/ AT   NECK  Supple and no bruits heard   MENTAL STATUS:  Alert, oriented, intact memory, no confusion, normal speech, normal language, no hallucination or delusion   CRANIAL NERVES: II     -      PERRLA, fundi reveal intact venous pulsations, visual fields intact to confrontation  III,IV,VI -  EOMs full, no afferent defect, no                      MAREK, no ptosis  V     -     Normal facial sensation  VII    -     Normal facial symmetry  VIII   -     Intact hearing  IX,X -     Symmetrical palate  XI    -     Symmetrical shoulder shrug  XII   -     Midline tongue, no atrophy    MOTOR FUNCTION:  significant for subtle weakness of

## 2020-08-01 ENCOUNTER — RESULTS ONLY (OUTPATIENT)
Dept: LAB | Age: 25
End: 2020-08-01

## 2020-08-03 LAB
SARS-COV-2 RNA SPEC QL NAA+PROBE: NOT DETECTED
SPECIMEN SOURCE: NORMAL

## 2020-08-05 ENCOUNTER — MYC REFILL (OUTPATIENT)
Dept: OBGYN | Facility: OTHER | Age: 25
End: 2020-08-05

## 2020-08-05 ENCOUNTER — APPOINTMENT (OUTPATIENT)
Dept: OCCUPATIONAL MEDICINE | Facility: OTHER | Age: 25
End: 2020-08-05

## 2020-08-05 DIAGNOSIS — E28.2 PCOS (POLYCYSTIC OVARIAN SYNDROME): ICD-10-CM

## 2020-08-05 PROCEDURE — 86580 TB INTRADERMAL TEST: CPT

## 2020-08-05 NOTE — TELEPHONE ENCOUNTER
Metformin      Last Written Prescription Date:  11.15.19  Last Fill Quantity: 60,   # refills: 2  Last Office Visit: 3.4.2020

## 2020-08-06 RX ORDER — METFORMIN HCL 500 MG
TABLET, EXTENDED RELEASE 24 HR ORAL
Qty: 60 TABLET | Refills: 2 | Status: SHIPPED | OUTPATIENT
Start: 2020-08-06 | End: 2021-04-05

## 2020-08-19 ENCOUNTER — APPOINTMENT (OUTPATIENT)
Dept: OCCUPATIONAL MEDICINE | Facility: OTHER | Age: 25
End: 2020-08-19
Payer: COMMERCIAL

## 2020-08-19 PROCEDURE — 86580 TB INTRADERMAL TEST: CPT

## 2020-09-28 ENCOUNTER — RESULTS ONLY (OUTPATIENT)
Dept: LAB | Age: 25
End: 2020-09-28

## 2020-09-30 LAB
SARS-COV-2 RNA SPEC QL NAA+PROBE: NOT DETECTED
SPECIMEN SOURCE: NORMAL

## 2020-11-27 DIAGNOSIS — E28.2 PCOS (POLYCYSTIC OVARIAN SYNDROME): ICD-10-CM

## 2020-11-27 RX ORDER — ACETAMINOPHEN AND CODEINE PHOSPHATE 120; 12 MG/5ML; MG/5ML
0.35 SOLUTION ORAL 2 TIMES DAILY WITH MEALS
Qty: 56 TABLET | Refills: 11 | Status: SHIPPED | OUTPATIENT
Start: 2020-11-27 | End: 2021-12-20

## 2020-11-27 NOTE — TELEPHONE ENCOUNTER
norethindrone      Last Written Prescription Date:  11/01/2019  Last Fill Quantity: 56,   # refills: 11  Last Office Visit: 03/04/2020  Future Office visit:       Routing refill request to provider for review/approval because:

## 2021-01-03 ENCOUNTER — HEALTH MAINTENANCE LETTER (OUTPATIENT)
Age: 26
End: 2021-01-03

## 2021-01-05 DIAGNOSIS — E28.2 PCOS (POLYCYSTIC OVARIAN SYNDROME): ICD-10-CM

## 2021-01-05 RX ORDER — SPIRONOLACTONE 25 MG/1
TABLET ORAL
Qty: 60 TABLET | Refills: 4 | Status: SHIPPED | OUTPATIENT
Start: 2021-01-05 | End: 2021-06-30

## 2021-01-05 NOTE — TELEPHONE ENCOUNTER
spironolactone      Last Written Prescription Date:  60  Last Fill Quantity: 60,   # refills: 4  Last Office Visit: 03/04/2020  Future Office visit:       Routing refill request to provider for review/approval because:

## 2021-02-10 ENCOUNTER — ALLIED HEALTH/NURSE VISIT (OUTPATIENT)
Dept: FAMILY MEDICINE | Facility: OTHER | Age: 26
End: 2021-02-10
Attending: DERMATOLOGY
Payer: COMMERCIAL

## 2021-02-10 DIAGNOSIS — T14.8XXA OPEN WOUND: Primary | ICD-10-CM

## 2021-02-10 DIAGNOSIS — L08.9 PUSTULAR LESION: Primary | ICD-10-CM

## 2021-02-10 PROCEDURE — 87205 SMEAR GRAM STAIN: CPT | Performed by: NURSE PRACTITIONER

## 2021-02-10 PROCEDURE — 87070 CULTURE OTHR SPECIMN AEROBIC: CPT | Performed by: NURSE PRACTITIONER

## 2021-02-10 NOTE — PROGRESS NOTES
Order sent for wound culture per patient's request.     Annie Hobbs APRN FNP-BC  Diabetes and Wound Care

## 2021-02-11 LAB
GRAM STN SPEC: ABNORMAL
GRAM STN SPEC: ABNORMAL
SPECIMEN SOURCE: ABNORMAL

## 2021-02-12 ENCOUNTER — OFFICE VISIT (OUTPATIENT)
Dept: FAMILY MEDICINE | Facility: OTHER | Age: 26
End: 2021-02-12
Attending: NURSE PRACTITIONER
Payer: COMMERCIAL

## 2021-02-12 VITALS
HEIGHT: 62 IN | HEART RATE: 72 BPM | DIASTOLIC BLOOD PRESSURE: 66 MMHG | OXYGEN SATURATION: 98 % | BODY MASS INDEX: 25.03 KG/M2 | TEMPERATURE: 98.8 F | WEIGHT: 136 LBS | SYSTOLIC BLOOD PRESSURE: 117 MMHG

## 2021-02-12 DIAGNOSIS — L70.0 CYSTIC ACNE: ICD-10-CM

## 2021-02-12 PROCEDURE — 99213 OFFICE O/P EST LOW 20 MIN: CPT | Performed by: NURSE PRACTITIONER

## 2021-02-12 ASSESSMENT — PAIN SCALES - GENERAL: PAINLEVEL: NO PAIN (0)

## 2021-02-12 ASSESSMENT — MIFFLIN-ST. JEOR: SCORE: 1302.2

## 2021-02-12 NOTE — PATIENT INSTRUCTIONS
Assessment & Plan     Cystic acne  - Continue Adapaline and Cetaphyl  - Await culture, then will decide on antibiotic therapy      Ana Toribio, CELSO  [facility] - [place] [facility]

## 2021-02-12 NOTE — Clinical Note
Noe Valencia!  Please see gram stain and culture.  Cystic acne present, failed bactoban, using adapaline and cetaphyl soap.  Submental node enlargement.  I would like to put her on Septra for a couple weeks.  AMELIA

## 2021-02-12 NOTE — PROGRESS NOTES
Assessment & Plan     Cystic acne  - Continue Adapaline and Cetaphyl  - Septra DS BID 14 days      Ana Toribio CNP  [facility] - REBEKA Ragladn is a 25 year old who presents for the following health issues       Acne    Onset: early adulthood     Description:   Location: Chin  and Shoulders   Character: (white or black head) comedonal, (pimple with pus) papulo-pustular and scarring    History: Have you had episodes like this before: YES    Accompanying Signs & Symptoms:                  Redness: YES        New exposures: None        Scarring: YES        Oily skin: no        Excessive sweating: YES- when working out        Fevers: no         Ever been treated by a dermatologist: YES- bactroban    Progression of Symptoms: (better, worse, same): same    Therapies tried:         Rx:bactroban         OTC: differin and cetaphil         Did therapies help: YES- differin and cetaphil are beginning to help, bactroban did not help at all                       Patient Active Problem List   Diagnosis     DUB (dysfunctional uterine bleeding)-always--12/2018     PCOS (polycystic ovarian syndrome)--clinical signs and syptoms--12/2018     Irritable bowel syndrome with loose stools--IBS diet,Dicyclomine--12/2018     Cystic acne     Past Surgical History:   Procedure Laterality Date     ADENOIDECTOMY       C RAD RESEC TONSIL/PILLARS         Social History     Tobacco Use     Smoking status: Never Smoker     Smokeless tobacco: Never Used   Substance Use Topics     Alcohol use: Yes     Comment: occasionally     Family History   Problem Relation Age of Onset     Colorectal Cancer Mother      Throat cancer Father      Heart Disease Maternal Grandfather      Rheumatoid Arthritis Sister      Diabetes Other            Current Outpatient Medications   Medication Sig Dispense Refill     metFORMIN (GLUCOPHAGE-XR) 500 MG 24 hr tablet TAKE 1 TABLET BY MOUTH TWICE DAILY, WITH MEALS 60 tablet 2     norethindrone  "(MICRONOR) 0.35 MG tablet Take 1 tablet (0.35 mg) by mouth 2 times daily (with meals) 56 tablet 11     spironolactone (ALDACTONE) 25 MG tablet TAKE 1 TABLET BY MOUTH TWICE DAILY, WITH MEALS 60 tablet 4       No Known Allergies       Recent Labs   Lab Test 07/08/20  1233 11/06/19  1305 12/19/18  1929   ALT  --   --  20   CR 0.71  --  0.73   GFRESTIMATED >90  --  >90   GFRESTBLACK >90  --  >90   POTASSIUM 3.8  --  3.9   TSH  --  1.35 1.14        BP Readings from Last 3 Encounters:   02/12/21 117/66   03/04/20 100/60   02/18/20 110/60    Wt Readings from Last 3 Encounters:   02/12/21 61.7 kg (136 lb)   03/04/20 58.5 kg (129 lb)   02/18/20 61.1 kg (134 lb 12.8 oz)                Review of Systems   Constitutional, HEENT, cardiovascular, pulmonary, gi and gu systems are negative, except as otherwise noted.          Objective    /66   Pulse 72   Temp 98.8  F (37.1  C) (Tympanic)   Ht 1.562 m (5' 1.5\")   Wt 61.7 kg (136 lb)   SpO2 98%   BMI 25.28 kg/m    Body mass index is 25.28 kg/m .       Exam:  Constitutional: healthy, alert and no distress  Head: Normocephalic. No masses, lesions, tenderness or abnormalities  Cardiovascular: negative  Respiratory: negative  Gastrointestinal: negative  : Deferred  Skin: acne, cystic - chin - enlarged submental node                            "

## 2021-02-12 NOTE — NURSING NOTE
"Chief Complaint   Patient presents with     Derm Problem       Initial /66   Pulse 72   Temp 98.8  F (37.1  C) (Tympanic)   Ht 1.562 m (5' 1.5\")   Wt 61.7 kg (136 lb)   SpO2 98%   BMI 25.28 kg/m   Estimated body mass index is 25.28 kg/m  as calculated from the following:    Height as of this encounter: 1.562 m (5' 1.5\").    Weight as of this encounter: 61.7 kg (136 lb).  Medication Reconciliation: complete  Nancy Garcia LPN  "

## 2021-02-14 LAB
BACTERIA SPEC CULT: NORMAL
BACTERIA SPEC CULT: NORMAL
SPECIMEN SOURCE: NORMAL

## 2021-02-15 RX ORDER — SULFAMETHOXAZOLE/TRIMETHOPRIM 800-160 MG
1 TABLET ORAL 2 TIMES DAILY
Qty: 28 TABLET | Refills: 0 | Status: SHIPPED | OUTPATIENT
Start: 2021-02-15 | End: 2021-03-01

## 2021-02-19 NOTE — PROGRESS NOTES
SUBJECTIVE:   CC: Obie Norris is an 25 year old woman who presents for preventive health visit.       Patient has been advised of split billing requirements and indicates understanding: Yes     Healthy Habits:     Getting at least 3 servings of Calcium per day:  Yes    Bi-annual eye exam:  NO    Dental care twice a year:  Yes    Sleep apnea or symptoms of sleep apnea:  None    Diet:  Gluten-free/reduced    Frequency of exercise:  6-7 days/week    Duration of exercise:  45-60 minutes    Taking medications regularly:  Yes    Medication side effects:  None    PHQ-2 Total Score: 0    Additional concerns today:  No          Cystic Acne  Worsening symptoms - she did have wound culture done - normal kevon. She has an appointment with Dermatology coming soon.   Recently started on Bactrim - mild improvement   Taking aldactone     PCOS  Current symptoms -none  Taking Micronor, Aldactone and metformin - no side effects  Follows with Dr Gore [facility]    IBS  Obie was supposed to have colonoscopy.  She was following with Dr Lundberg local GI specialist, but her cost for colonoscopy would be to high for her to pay out of pocket.  She was trying to get referral to GI with [facility].   Her history is of chronic lower abdominal pain. Symptoms have slightly improved with diet changes, but she continues to have at least 2+ stools per day.  Her stools are now liquid to constipation.  She has tried eliminating gluten and dairy without any relief. Mom had stage 4 colon cancer diagnosed on her first colonoscopy at age 55. Dr Lundberg did check stool lactoferrin and tTG.  He was hoping to do a colonoscopy with random Biopsys.       Today's PHQ-2 Score:   PHQ-2 ( 1999 [employer]) 2/22/2021   Q1: Little interest or pleasure in doing things 0   Q2: Feeling down, depressed or hopeless 0   PHQ-2 Score 0   Q1: Little interest or pleasure in doing things -   Q2: Feeling down, depressed or hopeless -   PHQ-2 Score -       Abuse: Current or Past  (Physical, Sexual or Emotional) - No  Do you feel safe in your environment? Yes    Have you ever done Advance Care Planning? (For example, a Health Directive, POLST, or a discussion with a medical provider or your loved ones about your wishes):     Social History     Tobacco Use     Smoking status: Never Smoker     Smokeless tobacco: Never Used   Substance Use Topics     Alcohol use: Yes     Comment: occasionally         Alcohol Use 2/16/2021   Prescreen: >3 drinks/day or >7 drinks/week? No         Reviewed orders with patient.  Reviewed health maintenance and updated orders accordingly - Yes  Lab work is in process  BP Readings from Last 3 Encounters:   02/22/21 106/68   02/12/21 117/66   03/04/20 100/60    Wt Readings from Last 3 Encounters:   02/22/21 62.1 kg (137 lb)   02/12/21 61.7 kg (136 lb)   03/04/20 58.5 kg (129 lb)                  Patient Active Problem List   Diagnosis     DUB (dysfunctional uterine bleeding)-always--12/2018     PCOS (polycystic ovarian syndrome)--clinical signs and syptoms--12/2018     Irritable bowel syndrome with loose stools--IBS diet,Dicyclomine--12/2018     Cystic acne     Past Surgical History:   Procedure Laterality Date     ADENOIDECTOMY       C RAD RESEC TONSIL/PILLARS         Social History     Tobacco Use     Smoking status: Never Smoker     Smokeless tobacco: Never Used   Substance Use Topics     Alcohol use: Yes     Comment: occasionally     Family History   Problem Relation Age of Onset     Colorectal Cancer Mother      Throat cancer Father      Heart Disease Maternal Grandfather      Rheumatoid Arthritis Sister      Diabetes Other          Current Outpatient Medications   Medication Sig Dispense Refill     metFORMIN (GLUCOPHAGE-XR) 500 MG 24 hr tablet TAKE 1 TABLET BY MOUTH TWICE DAILY, WITH MEALS 60 tablet 2     norethindrone (MICRONOR) 0.35 MG tablet Take 1 tablet (0.35 mg) by mouth 2 times daily (with meals) 56 tablet 11     spironolactone (ALDACTONE) 25 MG tablet  TAKE 1 TABLET BY MOUTH TWICE DAILY, WITH MEALS 60 tablet 4     sulfamethoxazole-trimethoprim (BACTRIM DS) 800-160 MG tablet Take 1 tablet by mouth 2 times daily for 14 days 28 tablet 0     valACYclovir (VALTREX) 500 MG tablet Take 1 tablet (500 mg) by mouth 2 times daily 6 tablet 3     No Known Allergies    Breast CA Risk Screening:  Breast CA Risk Assessment (FHS-7) 2/16/2021   Did any of your first-degree relatives have breast or ovarian cancer? No   Did any of your relatives have bilateral breast cancer? No   Did any man in your family have breast cancer? No   Did any woman in your family have breast and ovarian cancer? No   Did any woman in your family have breast cancer before age 50 y? No   Do you have 2 or more relatives with breast and/or ovarian cancer? No   Do you have 2 or more relatives with breast and/or bowel cancer? No         Patient under 40 years of age: Routine Mammogram Screening not recommended.   Pertinent mammograms are reviewed under the imaging tab.    History of abnormal Pap smear:   Last 3 Pap Results:   PAP (no units)   Date Value   11/06/2019 NIL     PAP / HPV 11/6/2019   PAP NIL     Reviewed and updated as needed this visit by clinical staff  Tobacco  Allergies  Meds   Med Hx  Surg Hx  Fam Hx  Soc Hx        Reviewed and updated as needed this visit by Provider                    Review of Systems  CONSTITUTIONAL: NEGATIVE for fever, chills, change in weight  INTEGUMENTARY/SKIN: flair up of cystic acne  EYES: NEGATIVE for vision changes or irritation  ENT: NEGATIVE for ear, mouth and throat problems  RESP: NEGATIVE for significant cough or SOB  BREAST: NEGATIVE for masses, tenderness or discharge  CV: NEGATIVE for chest pain, palpitations or peripheral edema  GI: Hx IBS - was supposed to have colonoscopy - having difficulty with referral to [facility] from Dr Lundberg - local GI specialist, 2+ stools per day anything from liquid to constipation and increased flatulence with any  "liquids beside plain water   female: no unusual urinary symptoms and no unusual vaginal symptoms  MUSCULOSKELETAL: NEGATIVE for significant arthralgias or myalgia  NEURO: NEGATIVE for weakness, dizziness or paresthesias  ENDOCRINE: worsening cystic acne  PSYCHIATRIC: NEGATIVE for changes in mood or affect     OBJECTIVE:   /68   Pulse 72   Temp 98.4  F (36.9  C) (Tympanic)   Ht 1.562 m (5' 1.5\")   Wt 62.1 kg (137 lb)   SpO2 99%   BMI 25.47 kg/m    Physical Exam  GENERAL: healthy, alert and no distress  EYES: Eyes grossly normal to inspection, PERRL and conjunctivae and sclerae normal  HENT: ear canals and TM's normal, nose and mouth without ulcers or lesions  NECK: no adenopathy, no asymmetry, masses, or scars and thyroid normal to palpation  RESP: lungs clear to auscultation - no rales, rhonchi or wheezes  CV: regular rate and rhythm, normal S1 S2, no S3 or S4, no murmur, click or rub, no peripheral edema and peripheral pulses strong  ABDOMEN: soft, nontender, without hepatosplenomegaly or masses and bowel sounds normal  MS: no gross musculoskeletal defects noted, no edema  SKIN: cystic acne on chin and upper back/shoulder area  NEURO: Normal strength and tone, sensory exam grossly normal, mentation intact and cranial nerves 2-12 intact  PSYCH: mentation appears normal, affect normal/bright  LYMPH: normal ant/post cervical, supraclavicular nodes    Diagnostic Test Results:  Labs reviewed in Epic  Results for orders placed or performed in visit on 02/22/21 (from the past 24 hour(s))   TSH with free T4 reflex   Result Value Ref Range    TSH 1.65 0.40 - 4.00 mU/L       ASSESSMENT/PLAN:   1. HSV (herpes simplex virus) infection  Stable - continue therapy as needed  - valACYclovir (VALTREX) 500 MG tablet; Take 1 tablet (500 mg) by mouth 2 times daily  Dispense: 6 tablet; Refill: 3    2. PCOS (polycystic ovarian syndrome)--clinical signs and syptoms--12/2018  Normal thyroid level today  Waiting on Vitamin D " "level will notify once available   - TSH with free T4 reflex  - Vitamin D Deficiency    3. Cystic acne  Has an appointment coming up with dermatology next week  - TSH with free T4 reflex  - Vitamin D Deficiency    4. Irritable bowel syndrome with loose stools--IBS diet,Dicyclomine--12/2018  5. Family history of colon cancer  Continued IBS symptoms, frequent stools 2+ per day from liquid to constipation, lots of flatulence/uncontrolled   Plan to check a food panel-will notify of results once available.  Previously negative for Fecal lactoferrin and Tissue Transglutaminase. Referral to [facility] for [facility]. Mom diagnosed with stage 4 colon cancer at age 55  - Food Panel ADULT ([employer]); Future  - GASTROENTEROLOGY ADULT REF PROCEDURE ONLY; Future  - Food Panel ADULT ([employer])        Patient has been advised of split billing requirements and indicates understanding: No  COUNSELING:  Reviewed preventive health counseling, as reflected in patient instructions       Regular exercise       Healthy diet/nutrition    Estimated body mass index is 25.47 kg/m  as calculated from the following:    Height as of this encounter: 1.562 m (5' 1.5\").    Weight as of this encounter: 62.1 kg (137 lb).        She reports that she has never smoked. She has never used smokeless tobacco.      Counseling Resources:  ATP IV Guidelines  Pooled Cohorts Equation Calculator  Breast Cancer Risk Calculator  BRCA-Related Cancer Risk Assessment: FHS-7 Tool  FRAX Risk Assessment  ICSI Preventive Guidelines  Dietary Guidelines for Americans, 2010  USDA's MyPlate  ASA Prophylaxis  Lung CA Screening    EDWARD Byers CNP  [facility] - [place]  "

## 2021-02-22 ENCOUNTER — OFFICE VISIT (OUTPATIENT)
Dept: FAMILY MEDICINE | Facility: OTHER | Age: 26
End: 2021-02-22
Attending: NURSE PRACTITIONER
Payer: COMMERCIAL

## 2021-02-22 VITALS
WEIGHT: 137 LBS | TEMPERATURE: 98.4 F | HEIGHT: 62 IN | OXYGEN SATURATION: 99 % | BODY MASS INDEX: 25.21 KG/M2 | HEART RATE: 72 BPM | SYSTOLIC BLOOD PRESSURE: 106 MMHG | DIASTOLIC BLOOD PRESSURE: 68 MMHG

## 2021-02-22 DIAGNOSIS — L70.0 CYSTIC ACNE: ICD-10-CM

## 2021-02-22 DIAGNOSIS — Z80.0 FAMILY HISTORY OF COLON CANCER: ICD-10-CM

## 2021-02-22 DIAGNOSIS — E28.2 PCOS (POLYCYSTIC OVARIAN SYNDROME): Primary | ICD-10-CM

## 2021-02-22 DIAGNOSIS — K58.0 IRRITABLE BOWEL SYNDROME WITH DIARRHEA: ICD-10-CM

## 2021-02-22 DIAGNOSIS — B00.9 HSV (HERPES SIMPLEX VIRUS) INFECTION: ICD-10-CM

## 2021-02-22 LAB — TSH SERPL DL<=0.005 MIU/L-ACNC: 1.65 MU/L (ref 0.4–4)

## 2021-02-22 PROCEDURE — 86003 ALLG SPEC IGE CRUDE XTRC EA: CPT | Mod: 90 | Performed by: NURSE PRACTITIONER

## 2021-02-22 PROCEDURE — 82306 VITAMIN D 25 HYDROXY: CPT | Performed by: NURSE PRACTITIONER

## 2021-02-22 PROCEDURE — 99395 PREV VISIT EST AGE 18-39: CPT | Performed by: NURSE PRACTITIONER

## 2021-02-22 PROCEDURE — 84443 ASSAY THYROID STIM HORMONE: CPT | Performed by: NURSE PRACTITIONER

## 2021-02-22 PROCEDURE — 36415 COLL VENOUS BLD VENIPUNCTURE: CPT | Performed by: NURSE PRACTITIONER

## 2021-02-22 RX ORDER — VALACYCLOVIR HYDROCHLORIDE 500 MG/1
500 TABLET, FILM COATED ORAL 2 TIMES DAILY
Qty: 6 TABLET | Refills: 3 | Status: SHIPPED | OUTPATIENT
Start: 2021-02-22 | End: 2022-12-31

## 2021-02-22 ASSESSMENT — ANXIETY QUESTIONNAIRES
5. BEING SO RESTLESS THAT IT IS HARD TO SIT STILL: NOT AT ALL
3. WORRYING TOO MUCH ABOUT DIFFERENT THINGS: NOT AT ALL
GAD7 TOTAL SCORE: 0
2. NOT BEING ABLE TO STOP OR CONTROL WORRYING: NOT AT ALL
6. BECOMING EASILY ANNOYED OR IRRITABLE: NOT AT ALL
1. FEELING NERVOUS, ANXIOUS, OR ON EDGE: NOT AT ALL
7. FEELING AFRAID AS IF SOMETHING AWFUL MIGHT HAPPEN: NOT AT ALL
4. TROUBLE RELAXING: NOT AT ALL

## 2021-02-22 ASSESSMENT — MIFFLIN-ST. JEOR: SCORE: 1306.74

## 2021-02-22 ASSESSMENT — PAIN SCALES - GENERAL: PAINLEVEL: NO PAIN (0)

## 2021-02-22 ASSESSMENT — PATIENT HEALTH QUESTIONNAIRE - PHQ9: SUM OF ALL RESPONSES TO PHQ QUESTIONS 1-9: 0

## 2021-02-22 NOTE — NURSING NOTE
"Chief Complaint   Patient presents with     Physical       Initial /68   Pulse 72   Temp 98.4  F (36.9  C) (Tympanic)   Ht 1.562 m (5' 1.5\")   Wt 62.1 kg (137 lb)   SpO2 99%   BMI 25.47 kg/m   Estimated body mass index is 25.47 kg/m  as calculated from the following:    Height as of this encounter: 1.562 m (5' 1.5\").    Weight as of this encounter: 62.1 kg (137 lb).  Medication Reconciliation: complete  Yue Waters LPN  "

## 2021-02-23 ENCOUNTER — TELEPHONE (OUTPATIENT)
Dept: GASTROENTEROLOGY | Facility: CLINIC | Age: 26
End: 2021-02-23

## 2021-02-23 ASSESSMENT — ANXIETY QUESTIONNAIRES: GAD7 TOTAL SCORE: 0

## 2021-02-23 NOTE — TELEPHONE ENCOUNTER
Patient is scheduled for COLONOSCOPY with Dr. FRAGA    Spoke with: QUYEN    Date of Procedure: 04/09/2021    Location: [facility]    Sedation Type CS    Pre-op for Unit [facility] MAC and OR NOT NEEDED    (if yes advise patient they will need a pre-op prior to procedure)      Is patient on blood thinners? -NO (If yes- inform patient to follow up with PCP or provider for follow up instructions)     Informed patient they will need an adult  YES    Informed Patient of COVID Test Requirement YES    Preferred Pharmacy for Pre Prescription NA    Confirmed Nurse will call to complete assessment YES    Additional comments: NA

## 2021-02-24 LAB — DEPRECATED CALCIDIOL+CALCIFEROL SERPL-MC: 50 UG/L (ref 20–75)

## 2021-03-08 ENCOUNTER — OFFICE VISIT (OUTPATIENT)
Dept: DERMATOLOGY | Facility: OTHER | Age: 26
End: 2021-03-08
Attending: DERMATOLOGY
Payer: COMMERCIAL

## 2021-03-08 VITALS
TEMPERATURE: 97.7 F | HEIGHT: 61 IN | BODY MASS INDEX: 25.11 KG/M2 | WEIGHT: 133 LBS | OXYGEN SATURATION: 99 % | SYSTOLIC BLOOD PRESSURE: 120 MMHG | HEART RATE: 70 BPM | DIASTOLIC BLOOD PRESSURE: 60 MMHG

## 2021-03-08 DIAGNOSIS — L70.0 ACNE VULGARIS: Primary | ICD-10-CM

## 2021-03-08 PROCEDURE — 99213 OFFICE O/P EST LOW 20 MIN: CPT | Performed by: DERMATOLOGY

## 2021-03-08 RX ORDER — CLINDAMYCIN PHOSPHATE 11.9 MG/ML
SOLUTION TOPICAL
Qty: 60 ML | Refills: 11 | Status: SHIPPED | OUTPATIENT
Start: 2021-03-08 | End: 2022-04-06

## 2021-03-08 ASSESSMENT — MIFFLIN-ST. JEOR: SCORE: 1275.66

## 2021-03-08 ASSESSMENT — PAIN SCALES - GENERAL: PAINLEVEL: NO PAIN (0)

## 2021-03-08 NOTE — LETTER
3/8/2021       RE: Obie Norris  [address]  [place] [address]     Dear Colleague,    Thank you for referring your patient, Obie Norris, to the [facility]. Please see a copy of my visit note below.    Visit Date:   2021      SUBJECTIVE:  Obie returns for followup.  She is a young woman who felt that she had multiple cysts, was advised that this was more likely a folliculitis and requested that she get some cultures.  Today her cyst problem appears to be a form of acne, and she has started  Adapalene. Also had  a short trial of oral sulfa, but did have a reaction, so discontinued that.  She is on spironolactone for PCOS.      OBJECTIVE:  On exam today, her skin is very clear and free of any significant lesions.        ASSESSMENT:  I believe this is a form of acne, and it has responded nicely to the adapalene and spironolactone.         PLAN:  I advised that it would be appropriate to add a topical antibiotic, so I have recommended clindamycin solution that she apply in the morning and around suppertime, and then use her adapalene at night.  We may consider going up on her spironolactone from 50 mg a day to 100, but we will not do that now.  We discussed what acne is all about, the fact that she will have ups and downs with it, but right now I think things look good, and I advised that our attempt would be to smooth out her flares.  Overall, there is no evidence of any scarring.  She brought up Accutane, and I advised that Accutane should only be used if there is evidence of scarring or  resistance to normal therapies.       Recheck p.r.n. in the next 6 months.         SKYLER FAICRHILD MD             D: 2021   T: 2021   MT: [place]      Name:     OBIE NORRIS   MRN:      [id number]        Account:      [id number]   :      1995           Visit Date:   2021      Document:  [id number]          Again, thank you for allowing me to participate in the care of your patient.      Sincerely,    SKYLER Ferguson MD

## 2021-03-08 NOTE — NURSING NOTE
"Chief Complaint   Patient presents with     RECHECK     cystes       Initial /60 (BP Location: Left arm, Patient Position: Chair, Cuff Size: Adult Regular)   Pulse 70   Temp 97.7  F (36.5  C) (Tympanic)   Ht 1.549 m (5' 1\")   Wt 60.3 kg (133 lb)   SpO2 99%   BMI 25.13 kg/m   Estimated body mass index is 25.13 kg/m  as calculated from the following:    Height as of this encounter: 1.549 m (5' 1\").    Weight as of this encounter: 60.3 kg (133 lb).  Medication Reconciliation: complete  GENARO CARVAJAL LPN    "

## 2021-03-09 NOTE — PROGRESS NOTES
Visit Date:   2021      SUBJECTIVE:  Obie returns for followup.  She is a young woman who felt that she had multiple cysts, was advised that this was more likely a folliculitis and requested that she get some cultures.  Today her cyst problem appears to be a form of acne, and she has started  Adapalene. Also had  a short trial of oral sulfa, but did have a reaction, so discontinued that.  She is on spironolactone for PCOS.      OBJECTIVE:  On exam today, her skin is very clear and free of any significant lesions.        ASSESSMENT:  I believe this is a form of acne, and it has responded nicely to the adapalene and spironolactone.         PLAN:  I advised that it would be appropriate to add a topical antibiotic, so I have recommended clindamycin solution that she apply in the morning and around suppertime, and then use her adapalene at night.  We may consider going up on her spironolactone from 50 mg a day to 100, but we will not do that now.  We discussed what acne is all about, the fact that she will have ups and downs with it, but right now I think things look good, and I advised that our attempt would be to smooth out her flares.  Overall, there is no evidence of any scarring.  She brought up Accutane, and I advised that Accutane should only be used if there is evidence of scarring or  resistance to normal therapies.       Recheck p.r.n. in the next 6 months.         SKYLER FAIRCHILD MD             D: 2021   T: 2021   MT: CESARIO      Name:     OBIE MENDENHALL   MRN:      [id number]        Account:      [id number]   :      1995           Visit Date:   2021      Document: [id number]

## 2021-03-25 DIAGNOSIS — Z11.59 ENCOUNTER FOR SCREENING FOR OTHER VIRAL DISEASES: ICD-10-CM

## 2021-04-01 ENCOUNTER — TELEPHONE (OUTPATIENT)
Dept: GASTROENTEROLOGY | Facility: CLINIC | Age: 26
End: 2021-04-01

## 2021-04-01 DIAGNOSIS — K58.0 IRRITABLE BOWEL SYNDROME WITH DIARRHEA: Primary | ICD-10-CM

## 2021-04-01 RX ORDER — BISACODYL 5 MG/1
TABLET, DELAYED RELEASE ORAL
Qty: 4 TABLET | Refills: 0 | Status: SHIPPED | OUTPATIENT
Start: 2021-04-01 | End: 2021-06-01

## 2021-04-01 NOTE — TELEPHONE ENCOUNTER
Pre assessment questions completed.     Golyte prep script sent to [facility] pharmacy. Prep instructions sent via [employer].    Patient had no questions or concerns at this time.    Kirstin Camejo RN

## 2021-04-05 ENCOUNTER — OFFICE VISIT (OUTPATIENT)
Dept: FAMILY MEDICINE | Facility: OTHER | Age: 26
End: 2021-04-05
Attending: NURSE PRACTITIONER
Payer: COMMERCIAL

## 2021-04-05 DIAGNOSIS — Z01.818 PREOP GENERAL PHYSICAL EXAM: Primary | ICD-10-CM

## 2021-04-05 DIAGNOSIS — E28.2 PCOS (POLYCYSTIC OVARIAN SYNDROME): ICD-10-CM

## 2021-04-05 LAB
SARS-COV-2 RNA RESP QL NAA+PROBE: NORMAL
SPECIMEN SOURCE: NORMAL

## 2021-04-05 PROCEDURE — U0003 INFECTIOUS AGENT DETECTION BY NUCLEIC ACID (DNA OR RNA); SEVERE ACUTE RESPIRATORY SYNDROME CORONAVIRUS 2 (SARS-COV-2) (CORONAVIRUS DISEASE [COVID-19]), AMPLIFIED PROBE TECHNIQUE, MAKING USE OF HIGH THROUGHPUT TECHNOLOGIES AS DESCRIBED BY CMS-2020-01-R: HCPCS | Performed by: INTERNAL MEDICINE

## 2021-04-05 PROCEDURE — U0005 INFEC AGEN DETEC AMPLI PROBE: HCPCS | Performed by: INTERNAL MEDICINE

## 2021-04-05 RX ORDER — METFORMIN HCL 500 MG
TABLET, EXTENDED RELEASE 24 HR ORAL
Qty: 60 TABLET | Refills: 5 | Status: SHIPPED | OUTPATIENT
Start: 2021-04-05 | End: 2022-04-06

## 2021-04-06 LAB
LABORATORY COMMENT REPORT: NORMAL
SARS-COV-2 RNA RESP QL NAA+PROBE: NEGATIVE
SPECIMEN SOURCE: NORMAL

## 2021-04-09 ENCOUNTER — HOSPITAL ENCOUNTER (OUTPATIENT)
Facility: AMBULATORY SURGERY CENTER | Age: 26
Discharge: HOME | End: 2021-04-09
Attending: INTERNAL MEDICINE | Admitting: INTERNAL MEDICINE
Payer: COMMERCIAL

## 2021-04-09 VITALS
RESPIRATION RATE: 14 BRPM | HEIGHT: 62 IN | DIASTOLIC BLOOD PRESSURE: 69 MMHG | TEMPERATURE: 98.1 F | SYSTOLIC BLOOD PRESSURE: 109 MMHG | WEIGHT: 130 LBS | HEART RATE: 69 BPM | BODY MASS INDEX: 23.92 KG/M2 | OXYGEN SATURATION: 96 %

## 2021-04-09 LAB
COLONOSCOPY: NORMAL
HCG UR QL: NEGATIVE
INTERNAL QC OK POCT: YES

## 2021-04-09 PROCEDURE — 81025 URINE PREGNANCY TEST: CPT | Performed by: INTERNAL MEDICINE

## 2021-04-09 PROCEDURE — 45378 DIAGNOSTIC COLONOSCOPY: CPT

## 2021-04-09 RX ORDER — LIDOCAINE 40 MG/G
CREAM TOPICAL
Status: DISCONTINUED | OUTPATIENT
Start: 2021-04-09 | End: 2021-04-09 | Stop reason: HOSPADM

## 2021-04-09 RX ORDER — SIMETHICONE
LIQUID (ML) MISCELLANEOUS PRN
Status: DISCONTINUED | OUTPATIENT
Start: 2021-04-09 | End: 2021-04-09 | Stop reason: HOSPADM

## 2021-04-09 RX ORDER — NALOXONE HYDROCHLORIDE 0.4 MG/ML
0.2 INJECTION, SOLUTION INTRAMUSCULAR; INTRAVENOUS; SUBCUTANEOUS
Status: DISCONTINUED | OUTPATIENT
Start: 2021-04-09 | End: 2021-04-10 | Stop reason: HOSPADM

## 2021-04-09 RX ORDER — ONDANSETRON 2 MG/ML
4 INJECTION INTRAMUSCULAR; INTRAVENOUS
Status: COMPLETED | OUTPATIENT
Start: 2021-04-09 | End: 2021-04-09

## 2021-04-09 RX ORDER — FLUMAZENIL 0.1 MG/ML
0.2 INJECTION, SOLUTION INTRAVENOUS
Status: ACTIVE | OUTPATIENT
Start: 2021-04-09 | End: 2021-04-09

## 2021-04-09 RX ORDER — NALOXONE HYDROCHLORIDE 0.4 MG/ML
0.4 INJECTION, SOLUTION INTRAMUSCULAR; INTRAVENOUS; SUBCUTANEOUS
Status: DISCONTINUED | OUTPATIENT
Start: 2021-04-09 | End: 2021-04-10 | Stop reason: HOSPADM

## 2021-04-09 RX ORDER — ONDANSETRON 2 MG/ML
4 INJECTION INTRAMUSCULAR; INTRAVENOUS EVERY 6 HOURS PRN
Status: DISCONTINUED | OUTPATIENT
Start: 2021-04-09 | End: 2021-04-10 | Stop reason: HOSPADM

## 2021-04-09 RX ORDER — ONDANSETRON 4 MG/1
4 TABLET, ORALLY DISINTEGRATING ORAL EVERY 6 HOURS PRN
Status: DISCONTINUED | OUTPATIENT
Start: 2021-04-09 | End: 2021-04-10 | Stop reason: HOSPADM

## 2021-04-09 RX ORDER — DIPHENHYDRAMINE HYDROCHLORIDE 50 MG/ML
INJECTION INTRAMUSCULAR; INTRAVENOUS PRN
Status: DISCONTINUED | OUTPATIENT
Start: 2021-04-09 | End: 2021-04-09 | Stop reason: HOSPADM

## 2021-04-09 RX ORDER — FENTANYL CITRATE 50 UG/ML
INJECTION, SOLUTION INTRAMUSCULAR; INTRAVENOUS PRN
Status: DISCONTINUED | OUTPATIENT
Start: 2021-04-09 | End: 2021-04-09 | Stop reason: HOSPADM

## 2021-04-09 RX ORDER — PROCHLORPERAZINE MALEATE 10 MG
10 TABLET ORAL EVERY 6 HOURS PRN
Status: DISCONTINUED | OUTPATIENT
Start: 2021-04-09 | End: 2021-04-10 | Stop reason: HOSPADM

## 2021-04-09 ASSESSMENT — MIFFLIN-ST. JEOR: SCORE: 1269.99

## 2021-04-09 NOTE — H&P
Obie BONILLA Norris  [id number]  female  26 year old      Reason for procedure/surgery: IBS    Patient Active Problem List   Diagnosis     DUB (dysfunctional uterine bleeding)-always--12/2018     PCOS (polycystic ovarian syndrome)--clinical signs and syptoms--12/2018     Irritable bowel syndrome with loose stools--IBS diet,Dicyclomine--12/2018     Cystic acne       Past Surgical History:    Past Surgical History:   Procedure Laterality Date     ADENOIDECTOMY       C RAD RESEC TONSIL/PILLARS         Past Medical History:   Past Medical History:   Diagnosis Date     Cystic acne 2/12/2021       Social History:   Social History     Tobacco Use     Smoking status: Never Smoker     Smokeless tobacco: Never Used   Substance Use Topics     Alcohol use: Yes     Comment: occasionally       Family History:   Family History   Problem Relation Age of Onset     Colorectal Cancer Mother      Throat cancer Father      Heart Disease Maternal Grandfather      Rheumatoid Arthritis Sister      Diabetes Other        Allergies:   Allergies   Allergen Reactions     Sulfa Drugs Hives       Active Medications:   Current Outpatient Medications   Medication Sig Dispense Refill     bisacodyl (DULCOLAX) 5 MG EC tablet Take as directed in mychart patient instructions 4 tablet 0     metFORMIN (GLUCOPHAGE-XR) 500 MG 24 hr tablet TAKE 1 TABLET BY MOUTH TWICE DAILY, WITH MEALS 60 tablet 5     norethindrone (MICRONOR) 0.35 MG tablet Take 1 tablet (0.35 mg) by mouth 2 times daily (with meals) 56 tablet 11     polyethylene glycol (GOLYTELY) 236 g suspension Take as directed in mychart patient instructions. 4000 mL 0     spironolactone (ALDACTONE) 25 MG tablet TAKE 1 TABLET BY MOUTH TWICE DAILY, WITH MEALS 60 tablet 4     valACYclovir (VALTREX) 500 MG tablet Take 1 tablet (500 mg) by mouth 2 times daily 6 tablet 3     clindamycin (CLEOCIN T) 1 % external solution Apply to face bid for acne 60 mL 11       Systemic Review:   CONSTITUTIONAL: NEGATIVE for  "fever, chills, change in weight  ENT/MOUTH: NEGATIVE for ear, mouth and throat problems  RESP: NEGATIVE for significant cough or SOB  CV: NEGATIVE for chest pain, palpitations or peripheral edema    Physical Examination:   Vital Signs: /74   Pulse 76   Temp 97.7  F (36.5  C) (Temporal)   Resp 16   Ht 1.562 m (5' 1.5\")   Wt 59 kg (130 lb)   LMP 03/24/2021 (Approximate)   SpO2 95%   Breastfeeding No   BMI 24.17 kg/m    GENERAL: healthy, alert and no distress  NECK: no adenopathy, no asymmetry, masses, or scars  RESP: lungs clear to auscultation - no rales, rhonchi or wheezes  CV: regular rate and rhythm, normal S1 S2, no S3 or S4, no murmur, click or rub, no peripheral edema and peripheral pulses strong  ABDOMEN: soft, nontender, no hepatosplenomegaly, no masses and bowel sounds normal  MS: no gross musculoskeletal defects noted, no edema    ASA 1  MP 2    Plan: Appropriate to proceed as scheduled.      Sd Vaca MD  4/9/2021    PCP:  Jesenia Pavon    "

## 2021-05-13 ENCOUNTER — ALLIED HEALTH/NURSE VISIT (OUTPATIENT)
Dept: FAMILY MEDICINE | Facility: OTHER | Age: 26
End: 2021-05-13
Attending: NURSE PRACTITIONER
Payer: COMMERCIAL

## 2021-05-13 DIAGNOSIS — R07.0 THROAT PAIN: Primary | ICD-10-CM

## 2021-05-13 LAB
DEPRECATED S PYO AG THROAT QL EIA: NEGATIVE
SPECIMEN SOURCE: NORMAL
SPECIMEN SOURCE: NORMAL
STREP GROUP A PCR: NOT DETECTED

## 2021-05-13 PROCEDURE — 99N1174 PR STATISTIC STREP A RAPID: Performed by: INTERNAL MEDICINE

## 2021-05-13 PROCEDURE — 87651 STREP A DNA AMP PROBE: CPT | Performed by: INTERNAL MEDICINE

## 2021-05-17 ENCOUNTER — RESULTS ONLY (OUTPATIENT)
Dept: LAB | Age: 26
End: 2021-05-17

## 2021-07-09 ENCOUNTER — TELEPHONE (OUTPATIENT)
Dept: FAMILY MEDICINE | Facility: OTHER | Age: 26
End: 2021-07-09

## 2021-07-09 DIAGNOSIS — R53.83 FATIGUE, UNSPECIFIED TYPE: Primary | ICD-10-CM

## 2021-07-09 DIAGNOSIS — R53.83 FATIGUE, UNSPECIFIED TYPE: ICD-10-CM

## 2021-07-09 LAB
BASOPHILS # BLD AUTO: 0 10E9/L (ref 0–0.2)
BASOPHILS NFR BLD AUTO: 0.6 %
DIFFERENTIAL METHOD BLD: NORMAL
EOSINOPHIL # BLD AUTO: 0.1 10E9/L (ref 0–0.7)
EOSINOPHIL NFR BLD AUTO: 1.1 %
ERYTHROCYTE [DISTWIDTH] IN BLOOD BY AUTOMATED COUNT: 12 % (ref 10–15)
HCT VFR BLD AUTO: 39.6 % (ref 35–47)
HGB BLD-MCNC: 13.9 G/DL (ref 11.7–15.7)
IMM GRANULOCYTES # BLD: 0 10E9/L (ref 0–0.4)
IMM GRANULOCYTES NFR BLD: 0.2 %
IRON SATN MFR SERPL: 26 % (ref 15–46)
IRON SERPL-MCNC: 81 UG/DL (ref 35–180)
LYMPHOCYTES # BLD AUTO: 2.1 10E9/L (ref 0.8–5.3)
LYMPHOCYTES NFR BLD AUTO: 33.5 %
MCH RBC QN AUTO: 32.3 PG (ref 26.5–33)
MCHC RBC AUTO-ENTMCNC: 35.1 G/DL (ref 31.5–36.5)
MCV RBC AUTO: 92 FL (ref 78–100)
MONOCYTES # BLD AUTO: 0.6 10E9/L (ref 0–1.3)
MONOCYTES NFR BLD AUTO: 9.8 %
NEUTROPHILS # BLD AUTO: 3.4 10E9/L (ref 1.6–8.3)
NEUTROPHILS NFR BLD AUTO: 54.8 %
NRBC # BLD AUTO: 0 10*3/UL
NRBC BLD AUTO-RTO: 0 /100
PLATELET # BLD AUTO: 261 10E9/L (ref 150–450)
RBC # BLD AUTO: 4.3 10E12/L (ref 3.8–5.2)
TIBC SERPL-MCNC: 317 UG/DL (ref 240–430)
TSH SERPL DL<=0.005 MIU/L-ACNC: 1.42 MU/L (ref 0.4–4)
WBC # BLD AUTO: 6.2 10E9/L (ref 4–11)

## 2021-07-09 PROCEDURE — 83540 ASSAY OF IRON: CPT | Performed by: NURSE PRACTITIONER

## 2021-07-09 PROCEDURE — 84443 ASSAY THYROID STIM HORMONE: CPT | Performed by: NURSE PRACTITIONER

## 2021-07-09 PROCEDURE — 36415 COLL VENOUS BLD VENIPUNCTURE: CPT | Performed by: NURSE PRACTITIONER

## 2021-07-09 PROCEDURE — 83550 IRON BINDING TEST: CPT | Performed by: NURSE PRACTITIONER

## 2021-07-09 PROCEDURE — 85025 COMPLETE CBC W/AUTO DIFF WBC: CPT | Performed by: NURSE PRACTITIONER

## 2021-07-09 PROCEDURE — 82306 VITAMIN D 25 HYDROXY: CPT | Performed by: NURSE PRACTITIONER

## 2021-07-12 ENCOUNTER — MYC MEDICAL ADVICE (OUTPATIENT)
Dept: FAMILY MEDICINE | Facility: OTHER | Age: 26
End: 2021-07-12

## 2021-07-12 LAB — DEPRECATED CALCIDIOL+CALCIFEROL SERPL-MC: 61 UG/L (ref 20–75)

## 2021-07-12 ASSESSMENT — ANXIETY QUESTIONNAIRES
GAD7 TOTAL SCORE: 3
3. WORRYING TOO MUCH ABOUT DIFFERENT THINGS: NOT AT ALL
2. NOT BEING ABLE TO STOP OR CONTROL WORRYING: SEVERAL DAYS
4. TROUBLE RELAXING: NOT AT ALL
5. BEING SO RESTLESS THAT IT IS HARD TO SIT STILL: NOT AT ALL
IF YOU CHECKED OFF ANY PROBLEMS ON THIS QUESTIONNAIRE, HOW DIFFICULT HAVE THESE PROBLEMS MADE IT FOR YOU TO DO YOUR WORK, TAKE CARE OF THINGS AT HOME, OR GET ALONG WITH OTHER PEOPLE: NOT DIFFICULT AT ALL
6. BECOMING EASILY ANNOYED OR IRRITABLE: SEVERAL DAYS
7. FEELING AFRAID AS IF SOMETHING AWFUL MIGHT HAPPEN: NOT AT ALL
1. FEELING NERVOUS, ANXIOUS, OR ON EDGE: SEVERAL DAYS

## 2021-07-12 ASSESSMENT — PATIENT HEALTH QUESTIONNAIRE - PHQ9: SUM OF ALL RESPONSES TO PHQ QUESTIONS 1-9: 10

## 2021-07-12 NOTE — PROGRESS NOTES
Obie is a 26 year old who is being evaluated via a billable telephone visit.      What phone number would you like to be contacted at? [phone]  How would you like to obtain your AVS? Amelie    Assessment & Plan     Dysthymia  Increased stress in life. Feeling more depressed and low motivation.  Mom has cancer, going to collage and working full time.  Encouraged counseling and trial of selective serotonin reuptake inhibitor medication. Discussed side effects such as but not limited to worsening anxiety and depression, SI, upset stomach and headaches.  - MENTAL HEALTH REFERRAL  - Adult; Outpatient Treatment; Individual/Couples/Family/Group Therapy/Health Psychology; [phone]; We will contact you to schedule the appointment or please call ...  - sertraline (ZOLOFT) 25 MG tablet; Take 1 tablet (25 mg) by mouth daily    Reviewed previous test   20 minutes spent on the date of the encounter doing chart review, review of test results, interpretation of tests, patient visit and documentation        See Patient Instructions    Return in about 4 weeks (around 8/10/2021) for depression.   She will call to set up follow up appointment     EDWARD Byers [facility] - ZEINAB Bejarano   Obie is a 26 year old who presents for the following health issues     HPI     Abnormal Mood Symptoms  Onset/Duration: 8 months  Description: unmotivated, dread working, negative attitude  Depression (if yes, do PHQ-9): YES  Anxiety (if yes, do SONNY-7): no  Accompanying Signs & Symptoms:  Still participating in activities that you used to enjoy: occasionally, I usually spend my days off laying around the house  Fatigue: no  Irritability: YES- can't even listen to people joke around with me  Difficulty concentrating: YES- Always have   Changes in appetite: no  Problems with sleep: YES- sleep for like 6 hours and get up and go back to sleep for  15 minutes at a time  Heart racing/beating fast: no  Abnormally elevated, expansive, or irritable mood: YES- seem to be annoyed about a lot of things  Persistently increased activity or energy: no  Thoughts of hurting yourself or others: no  History:  Recent stress or major life event: YES- school, wedding planning, death of family member  Prior depression or anxiety: None  Family history of depression or anxiety: no  Alcohol/drug use: YES- drink alcohol like once a month  Difficulty sleeping: YES  Precipitating or alleviating factors: None  Therapies tried and outcome: Exercise about 4-5 times a week  Previous - no medications for depression  Feels like her depression started about age 14 when mom moved out of state.    Counseling - in the past with limited help   Denies suicidal ideation   PHQ 12/19/2018 2/22/2021 7/12/2021   PHQ-9 Total Score 3 0 10   Q9: Thoughts of better off dead/self-harm past 2 weeks Not at all Not at all Not at all     SONNY-7 SCORE 12/19/2018 2/22/2021 7/12/2021   Total Score 0 0 3         Review of Systems   CONSTITUTIONAL: NEGATIVE for fever, chills, change in weight  INTEGUMENTARY/SKIN: NEGATIVE for worrisome rashes, moles or lesions  RESP: NEGATIVE for significant cough or SOB  CV: NEGATIVE for chest pain, palpitations or peripheral edema  GI: NEGATIVE for nausea, abdominal pain, heartburn, or change in bowel habits  : denies dysuria   NEURO: NEGATIVE for weakness, dizziness or paresthesias  PSYCHIATRIC: depressed mood       Objective           Vitals:  No vitals were obtained today due to virtual visit.    Physical Exam   alert and no distress  PSYCH: Alert and oriented times 3; coherent speech, normal   rate and volume, able to articulate logical thoughts, able   to abstract reason, no tangential thoughts, no hallucinations   or delusions  Her affect is normal and pleasant  RESP: No cough, no audible wheezing, able to talk in full sentences  Remainder of exam unable to be completed  due to telephone visits    Orders Only on 07/09/2021   Component Date Value Ref Range Status     Iron 07/09/2021 81  35 - 180 ug/dL Final     Iron Binding Cap 07/09/2021 317  240 - 430 ug/dL Final     Iron Saturation Index 07/09/2021 26  15 - 46 % Final     WBC 07/09/2021 6.2  4.0 - 11.0 10e9/L Final     RBC Count 07/09/2021 4.30  3.8 - 5.2 10e12/L Final     Hemoglobin 07/09/2021 13.9  11.7 - 15.7 g/dL Final     Hematocrit 07/09/2021 39.6  35.0 - 47.0 % Final     MCV 07/09/2021 92  78 - 100 fl Final     MCH 07/09/2021 32.3  26.5 - 33.0 pg Final     MCHC 07/09/2021 35.1  31.5 - 36.5 g/dL Final     RDW 07/09/2021 12.0  10.0 - 15.0 % Final     Platelet Count 07/09/2021 261  150 - 450 10e9/L Final     Diff Method 07/09/2021 Automated Method   Final     % Neutrophils 07/09/2021 54.8  % Final     % Lymphocytes 07/09/2021 33.5  % Final     % Monocytes 07/09/2021 9.8  % Final     % Eosinophils 07/09/2021 1.1  % Final     % Basophils 07/09/2021 0.6  % Final     % Immature Granulocytes 07/09/2021 0.2  % Final     Nucleated RBCs 07/09/2021 0  0 /100 Final     Absolute Neutrophil 07/09/2021 3.4  1.6 - 8.3 10e9/L Final     Absolute Lymphocytes 07/09/2021 2.1  0.8 - 5.3 10e9/L Final     Absolute Monocytes 07/09/2021 0.6  0.0 - 1.3 10e9/L Final     Absolute Eosinophils 07/09/2021 0.1  0.0 - 0.7 10e9/L Final     Absolute Basophils 07/09/2021 0.0  0.0 - 0.2 10e9/L Final     Abs Immature Granulocytes 07/09/2021 0.0  0 - 0.4 10e9/L Final     Absolute Nucleated RBC 07/09/2021 0.0   Final     TSH 07/09/2021 1.42  0.40 - 4.00 mU/L Final     Vitamin D Deficiency screening 07/09/2021 61  20 - 75 ug/L Final    Comment: Season, race, dietary intake, and treatment affect the concentration of   25-hydroxy-Vitamin D. Values may decrease during winter months and increase   during summer months. Values 20-29 ug/L may indicate Vitamin D insufficiency   and values <20 ug/L may indicate Vitamin D deficiency.  Vitamin D determination is routinely  performed by an immunoassay specific for   25 hydroxyvitamin D3.  If an individual is on vitamin D2 (ergocalciferol)   supplementation, please specify 25 OH vitamin D2 and D3 level determination by   LCMSMS test [id number].                   Phone call duration: 12 minutes

## 2021-07-12 NOTE — TELEPHONE ENCOUNTER
You are scheduled for a telephone visit tomorrow.  I'll call you a little bit prior to appointment.

## 2021-07-13 ENCOUNTER — VIRTUAL VISIT (OUTPATIENT)
Dept: FAMILY MEDICINE | Facility: OTHER | Age: 26
End: 2021-07-13
Attending: NURSE PRACTITIONER
Payer: COMMERCIAL

## 2021-07-13 DIAGNOSIS — F34.1 DYSTHYMIA: Primary | ICD-10-CM

## 2021-07-13 PROCEDURE — 99214 OFFICE O/P EST MOD 30 MIN: CPT | Mod: TEL | Performed by: NURSE PRACTITIONER

## 2021-07-13 RX ORDER — SERTRALINE HYDROCHLORIDE 25 MG/1
25 TABLET, FILM COATED ORAL DAILY
Qty: 30 TABLET | Refills: 1 | Status: SHIPPED | OUTPATIENT
Start: 2021-07-13 | End: 2021-10-04

## 2021-07-13 ASSESSMENT — ANXIETY QUESTIONNAIRES: GAD7 TOTAL SCORE: 3

## 2021-07-13 NOTE — NURSING NOTE
"Chief Complaint   Patient presents with     MOOD CHANGES       Initial There were no vitals taken for this visit. Estimated body mass index is 24.17 kg/m  as calculated from the following:    Height as of 4/9/21: 1.562 m (5' 1.5\").    Weight as of 4/9/21: 59 kg (130 lb).  Medication Reconciliation: complete  Clay Hanson LPN  "

## 2021-08-05 ENCOUNTER — APPOINTMENT (OUTPATIENT)
Dept: OCCUPATIONAL MEDICINE | Facility: OTHER | Age: 26
End: 2021-08-05

## 2021-08-10 NOTE — PROGRESS NOTES
Otolaryngology Consultation    Patient: Obie Norris  : 1995    Patient presents with:  Consult: Scalp Lesion; Self Referral      HPI:  Obie Norris is a 26 year old female seen today for two scalp lesions.   They have been enlarging and getting caught in her comb when she brushes her hair.     She also has a right posterior neck nevus which has been growing.  No ulceration or bleeding    Present over 6 months, changing and growing.  No known history of carcinoma skin or melanoma.  Excess sun exposure with sun burns throughout life. No immunodeficiency.  There is no history of solid organ transplant.          Current Outpatient Rx   Medication Sig Dispense Refill     clindamycin (CLEOCIN T) 1 % external solution Apply to face bid for acne 60 mL 11     metFORMIN (GLUCOPHAGE-XR) 500 MG 24 hr tablet TAKE 1 TABLET BY MOUTH TWICE DAILY, WITH MEALS 60 tablet 5     norethindrone (MICRONOR) 0.35 MG tablet Take 1 tablet (0.35 mg) by mouth 2 times daily (with meals) 56 tablet 11     sertraline (ZOLOFT) 25 MG tablet Take 1 tablet (25 mg) by mouth daily 30 tablet 1     spironolactone (ALDACTONE) 25 MG tablet TAKE 1 TABLET BY MOUTH TWICE DAILY, WITH MEALS 60 tablet 4     valACYclovir (VALTREX) 500 MG tablet Take 1 tablet (500 mg) by mouth 2 times daily 6 tablet 3       Allergies: Sulfa drugs     Past Medical History:   Diagnosis Date     Cystic acne 2021       Past Surgical History:   Procedure Laterality Date     ADENOIDECTOMY       C RAD RESEC TONSIL/PILLARS       COLONOSCOPY N/A 2021    Procedure: COLONOSCOPY;  Surgeon: Sd Vaca MD;  Location: [facility] OR       ENT family history reviewed    Social History     Tobacco Use     Smoking status: Never Smoker     Smokeless tobacco: Never Used   Substance Use Topics     Alcohol use: Yes     Comment: occasionally     Drug use: Never       Review of Systems  ROS: 10 point ROS neg other than the symptoms noted above in the HPI and  "depression    Physical Exam  /60 (BP Location: Right arm, Patient Position: Chair, Cuff Size: Adult Regular)   Pulse 83   Temp 98.9  F (37.2  C) (Tympanic)   Ht 1.562 m (5' 1.5\")   Wt 62.6 kg (138 lb)   SpO2 98%   BMI 25.65 kg/m    General - The patient is well nourished and well developed, and appears to have good nutritional status.  Alert and oriented to person and place, answers questions and cooperates with examination appropriately.   Head and Face - Normocephalic and atraumatic, with no gross asymmetry noted.  The facial nerve is intact, with strong symmetric movements.  There is a raised verrucous lesion on the left parietal scalp and a central posterior tan-colored raised lesion.  Neither have ulceration each are approximately 1 cm  Neck -slightly raised 1 cm nevus without ulceration right posterior neck      Office Procedure:   I discussed the risks and complications of  Excision left and posterior scalp lesions, including alopecia, local anesthesia, bleeding, infection, injury to the facial nerve, scar formation, hypertrophic healing or keloid, numbness to area, recurrence of lesion, benign versus malignant pathology and possible need for further surgery. All questions were answered.    After informed consent was discussed and a time- out taken, I proceeded to cleanse the skin around the lesion with alcohol.  I then demarcated the lesion parallel to relaxed skin tension lines in a natural crease.  The area was prepped and draped in the normal sterile fashion.  I then infiltrated the skin with 1% lidocaine with 1:200,000 epinephrine.  I  used a 15-blade to create an elliptical incision around the lesion, with margins of 1-2 mm of grossly normal skin.  I then elevated the skin ellipse and a thin cuff of underlying subdermal fat with curved iris scissors.  I dissected down through normal subcutaneous tissue for complete removal of the lesion.  After the lesion was completely removed, I then " placed it in formalin for permanent section.  Hemostasis was achieved with direct pressure and hand held cautery. I then irrigated the wound and gently suctioned the area.  I advanced the adjacent skin for tension free closure using tenotomy scissors.       The total length of the incisions were 1.5 cm. Left scalp  1.2 cm central scalp    Excision and repair were performed in a similar fashion on the left and posterior scalp      I then proceeded to close the incision by using simple interrupted buried knot sutures, using 4-0 Vicryl.  The skin edges were then reapproximated using 2-0 nylon, simple interrupted sutures.  Antibiotic ointment was then applied and the wound was dressed.  The patient tolerated the procedure without any difficulty.    Impression and Plan- Obie Norris is a 26 year old female with:    ICD-10-CM    1. Scalp lesion  L98.9    2. Skin lesion of neck  L98.9              Additional surgery may need to be scheduled based on final pathology.  This was discussed today.    Further procedures may include skin excision with frozens and flap repair.    The risks of surgery were discussed, if further surgery is necessary.  These include but are not limited to anesthesia, scar or keloid formation, infection, flap failure, change in appearance of surrounding facial structures,  numbness to the skin, injury to the facial nerve with permanent facial weakness which is exceedingly rare but possible.  Temporary weakness to the face may occur with the use of local anesthesia.    The patient expressed understanding.  All questions were answered.  Photos were taken.      I have instructed the patient on wound care and signs of infection.  Written instructions provided.  We will contact the patient with pathology results.    Sunscreen use and skin cancer preventive measures were reinforced    Present back for removal of the neck lesion    Marely Alcantara D.O.  Otolaryngology/Head and Neck  Surgery  Allergy

## 2021-08-11 ENCOUNTER — OFFICE VISIT (OUTPATIENT)
Dept: OTOLARYNGOLOGY | Facility: OTHER | Age: 26
End: 2021-08-11
Attending: OTOLARYNGOLOGY
Payer: COMMERCIAL

## 2021-08-11 VITALS
SYSTOLIC BLOOD PRESSURE: 110 MMHG | TEMPERATURE: 98.9 F | HEART RATE: 83 BPM | BODY MASS INDEX: 25.4 KG/M2 | OXYGEN SATURATION: 98 % | HEIGHT: 62 IN | DIASTOLIC BLOOD PRESSURE: 60 MMHG | WEIGHT: 138 LBS

## 2021-08-11 DIAGNOSIS — L98.9 SCALP LESION: Primary | ICD-10-CM

## 2021-08-11 DIAGNOSIS — L98.9 SKIN LESION OF NECK: ICD-10-CM

## 2021-08-11 PROCEDURE — 88305 TISSUE EXAM BY PATHOLOGIST: CPT | Mod: 26 | Performed by: PATHOLOGY

## 2021-08-11 PROCEDURE — 11442 EXC FACE-MM B9+MARG 1.1-2 CM: CPT | Mod: 59 | Performed by: OTOLARYNGOLOGY

## 2021-08-11 PROCEDURE — 11442 EXC FACE-MM B9+MARG 1.1-2 CM: CPT | Performed by: OTOLARYNGOLOGY

## 2021-08-11 PROCEDURE — 99202 OFFICE O/P NEW SF 15 MIN: CPT | Mod: 25 | Performed by: OTOLARYNGOLOGY

## 2021-08-11 PROCEDURE — 88305 TISSUE EXAM BY PATHOLOGIST: CPT | Mod: TC | Performed by: OTOLARYNGOLOGY

## 2021-08-11 ASSESSMENT — MIFFLIN-ST. JEOR: SCORE: 1306.27

## 2021-08-11 ASSESSMENT — PAIN SCALES - GENERAL: PAINLEVEL: NO PAIN (0)

## 2021-08-11 NOTE — LETTER
2021         RE: Obie Norris  [address]  [place] [address]        Dear Colleague,    Thank you for referring your patient, Obie Norris, to the [facility]. Please see a copy of my visit note below.      Otolaryngology Consultation    Patient: Obie Norris  : 1995    Patient presents with:  Consult: Scalp Lesion; Self Referral      HPI:  Obie Norris is a 26 year old female seen today for two scalp lesions.   They have been enlarging and getting caught in her comb when she brushes her hair.     She also has a right posterior neck nevus which has been growing.  No ulceration or bleeding    Present over 6 months, changing and growing.  No known history of carcinoma skin or melanoma.  Excess sun exposure with sun burns throughout life. No immunodeficiency.  There is no history of solid organ transplant.          Current Outpatient Rx   Medication Sig Dispense Refill     clindamycin (CLEOCIN T) 1 % external solution Apply to face bid for acne 60 mL 11     metFORMIN (GLUCOPHAGE-XR) 500 MG 24 hr tablet TAKE 1 TABLET BY MOUTH TWICE DAILY, WITH MEALS 60 tablet 5     norethindrone (MICRONOR) 0.35 MG tablet Take 1 tablet (0.35 mg) by mouth 2 times daily (with meals) 56 tablet 11     sertraline (ZOLOFT) 25 MG tablet Take 1 tablet (25 mg) by mouth daily 30 tablet 1     spironolactone (ALDACTONE) 25 MG tablet TAKE 1 TABLET BY MOUTH TWICE DAILY, WITH MEALS 60 tablet 4     valACYclovir (VALTREX) 500 MG tablet Take 1 tablet (500 mg) by mouth 2 times daily 6 tablet 3       Allergies: Sulfa drugs     Past Medical History:   Diagnosis Date     Cystic acne 2021       Past Surgical History:   Procedure Laterality Date     ADENOIDECTOMY       C RAD RESEC TONSIL/PILLARS       COLONOSCOPY N/A 2021    Procedure: COLONOSCOPY;  Surgeon: Sd Vaca MD;  Location: [facility] OR       ENT family history reviewed    Social History     Tobacco Use     Smoking  "status: Never Smoker     Smokeless tobacco: Never Used   Substance Use Topics     Alcohol use: Yes     Comment: occasionally     Drug use: Never       Review of Systems  ROS: 10 point ROS neg other than the symptoms noted above in the HPI and depression    Physical Exam  /60 (BP Location: Right arm, Patient Position: Chair, Cuff Size: Adult Regular)   Pulse 83   Temp 98.9  F (37.2  C) (Tympanic)   Ht 1.562 m (5' 1.5\")   Wt 62.6 kg (138 lb)   SpO2 98%   BMI 25.65 kg/m    General - The patient is well nourished and well developed, and appears to have good nutritional status.  Alert and oriented to person and place, answers questions and cooperates with examination appropriately.   Head and Face - Normocephalic and atraumatic, with no gross asymmetry noted.  The facial nerve is intact, with strong symmetric movements.  There is a raised verrucous lesion on the left parietal scalp and a central posterior tan-colored raised lesion.  Neither have ulceration each are approximately 1 cm  Neck -slightly raised 1 cm nevus without ulceration right posterior neck      Office Procedure:   I discussed the risks and complications of  Excision left and posterior scalp lesions, including alopecia, local anesthesia, bleeding, infection, injury to the facial nerve, scar formation, hypertrophic healing or keloid, numbness to area, recurrence of lesion, benign versus malignant pathology and possible need for further surgery. All questions were answered.    After informed consent was discussed and a time- out taken, I proceeded to cleanse the skin around the lesion with alcohol.  I then demarcated the lesion parallel to relaxed skin tension lines in a natural crease.  The area was prepped and draped in the normal sterile fashion.  I then infiltrated the skin with 1% lidocaine with 1:200,000 epinephrine.  I  used a 15-blade to create an elliptical incision around the lesion, with margins of 1-2 mm of grossly normal skin.  I " then elevated the skin ellipse and a thin cuff of underlying subdermal fat with curved iris scissors.  I dissected down through normal subcutaneous tissue for complete removal of the lesion.  After the lesion was completely removed, I then placed it in formalin for permanent section.  Hemostasis was achieved with direct pressure and hand held cautery. I then irrigated the wound and gently suctioned the area.  I advanced the adjacent skin for tension free closure using tenotomy scissors.       The total length of the incisions were 1.5 cm. Left scalp  1.2 cm central scalp    Excision and repair were performed in a similar fashion on the left and posterior scalp      I then proceeded to close the incision by using simple interrupted buried knot sutures, using 4-0 Vicryl.  The skin edges were then reapproximated using 2-0 nylon, simple interrupted sutures.  Antibiotic ointment was then applied and the wound was dressed.  The patient tolerated the procedure without any difficulty.    Impression and Plan- Obie Norris is a 26 year old female with:    ICD-10-CM    1. Scalp lesion  L98.9    2. Skin lesion of neck  L98.9              Additional surgery may need to be scheduled based on final pathology.  This was discussed today.    Further procedures may include skin excision with frozens and flap repair.    The risks of surgery were discussed, if further surgery is necessary.  These include but are not limited to anesthesia, scar or keloid formation, infection, flap failure, change in appearance of surrounding facial structures,  numbness to the skin, injury to the facial nerve with permanent facial weakness which is exceedingly rare but possible.  Temporary weakness to the face may occur with the use of local anesthesia.    The patient expressed understanding.  All questions were answered.  Photos were taken.      I have instructed the patient on wound care and signs of infection.  Written instructions provided.  We  will contact the patient with pathology results.    Sunscreen use and skin cancer preventive measures were reinforced    Present back for removal of the neck lesion    Marely Alcantara D.O.  Otolaryngology/Head and Neck Surgery  Allergy          Again, thank you for allowing me to participate in the care of your patient.        Sincerely,        Marely Alcantara MD

## 2021-08-11 NOTE — NURSING NOTE
"Chief Complaint   Patient presents with     Consult     Scalp Lesion; Self Referral       Initial /60 (BP Location: Right arm, Patient Position: Chair, Cuff Size: Adult Regular)   Pulse 83   Temp 98.9  F (37.2  C) (Tympanic)   Ht 1.562 m (5' 1.5\")   Wt 62.6 kg (138 lb)   SpO2 98%   BMI 25.65 kg/m   Estimated body mass index is 25.65 kg/m  as calculated from the following:    Height as of this encounter: 1.562 m (5' 1.5\").    Weight as of this encounter: 62.6 kg (138 lb).  Medication Reconciliation: complete  GENARO CARVAJAL LPN    "

## 2021-08-11 NOTE — PATIENT INSTRUCTIONS
Thank you for allowing Dr. Alcantara and our ENT team to participate in your care.  If your medications are too expensive, please give the nurse a call.  We can possibly change this medication.  If you have a scheduling or an appointment question please contact our Health Unit Coordinator at their direct line [phone].   ALL nursing questions or concerns can be directed to your ENT nurse at: [phone] - Penny      POST PROCEDURE INSTRUCTIONS      Remove your dressing in 24 hours (If you have one)    Wash incision with Gentle Cleanser Twice Daily (Cetaphil, Baby Shampoo, etc)    Apply Bacitracin Ointment Three Times Daily; After 1 week, use Aquaphor Ointment     Cover with a clean dressing if in a dirty lolis environment or when wet/soiled    Keep incision clean and dry   Do NOT soak in water such as a tub bath or swimming   Do NOT put make-up, powders, hairspray, lotions, etc on the incision       You can apply ice to the surgical area to help reduce swelling. (no longer than 20 minutes at a time)      You can use acetaminophen(Tylenol) or the prescription you received for pain.       If you have any bleeding, cover the wound with clean gauze and hold pressure for 10 Minutes. If the bleeding does not stop or is heavy and profuse, call the clinic or go to the Urgent Care/Emergency Department.    SIGNS OF INFECTION ARE:    Redness, swelling, red streaks, pus, drainage, warmth, fever, increased pain, foul smell.     Contact your primary health care provider if you notice any of the warning signs.     FOLLOW - UP    Follow-up in clinic for a nurse only visit in 7 days for suture removal.     Pathology results will be called to you when they are back. Usually 7-10 days.      6 WEEKS POST PROCEDURE      Apply ANY type of lotion to the suture site(Example - Vaseline Intensive Care or Vitamin E)    Massage the surgical area 1-2 times daily in a circular motion for 5 minutes, for a period of 2 months. This  will help the scar heal better.

## 2021-08-13 ENCOUNTER — OFFICE VISIT (OUTPATIENT)
Dept: OTOLARYNGOLOGY | Facility: OTHER | Age: 26
End: 2021-08-13
Attending: NURSE PRACTITIONER
Payer: COMMERCIAL

## 2021-08-13 VITALS
DIASTOLIC BLOOD PRESSURE: 60 MMHG | SYSTOLIC BLOOD PRESSURE: 110 MMHG | WEIGHT: 135 LBS | TEMPERATURE: 97.4 F | BODY MASS INDEX: 25.09 KG/M2 | OXYGEN SATURATION: 98 % | HEART RATE: 77 BPM

## 2021-08-13 DIAGNOSIS — L08.9 LOCAL INFECTION OF SKIN AND SUBCUTANEOUS TISSUE: Primary | ICD-10-CM

## 2021-08-13 PROCEDURE — 99024 POSTOP FOLLOW-UP VISIT: CPT | Performed by: NURSE PRACTITIONER

## 2021-08-13 RX ORDER — MUPIROCIN 20 MG/G
OINTMENT TOPICAL 3 TIMES DAILY
Qty: 15 G | Refills: 1 | Status: SHIPPED | OUTPATIENT
Start: 2021-08-13 | End: 2021-08-20

## 2021-08-13 RX ORDER — CEPHALEXIN 500 MG/1
500 CAPSULE ORAL 2 TIMES DAILY
Qty: 14 CAPSULE | Refills: 0 | Status: SHIPPED | OUTPATIENT
Start: 2021-08-13 | End: 2021-08-20

## 2021-08-13 ASSESSMENT — PAIN SCALES - GENERAL: PAINLEVEL: NO PAIN (0)

## 2021-08-13 NOTE — LETTER
8/13/2021         RE: Obie Norris  [address] [address]        Dear Colleague,    Thank you for referring your patient, Obie Norris, to the [facility]. Please see a copy of my visit note below.    Otolaryngology Note         Chief Complaint:     Patient presents with:  Wound Check: Scalp lesion            History of Present Illness:     Obie Norris is a 26 year old female seen today for a wound check.  She had 2 lesions removed from her scalp by Dr Alcantara on 8/11.   She reports increased tenderness.  No fevers or adrienne purulence.  She has been trying to wash with mild soap and water but state it is difficult to see.  She took ibuprofen 800 mg today - right before her appointment.  She otherwise has not taken anything for pain.           Medications:     Current Outpatient Rx   Medication Sig Dispense Refill     cephALEXin (KEFLEX) 500 MG capsule Take 1 capsule (500 mg) by mouth 2 times daily for 7 days 14 capsule 0     clindamycin (CLEOCIN T) 1 % external solution Apply to face bid for acne 60 mL 11     metFORMIN (GLUCOPHAGE-XR) 500 MG 24 hr tablet TAKE 1 TABLET BY MOUTH TWICE DAILY, WITH MEALS 60 tablet 5     mupirocin (BACTROBAN) 2 % external ointment Apply topically 3 times daily for 7 days 15 g 1     norethindrone (MICRONOR) 0.35 MG tablet Take 1 tablet (0.35 mg) by mouth 2 times daily (with meals) 56 tablet 11     sertraline (ZOLOFT) 25 MG tablet Take 1 tablet (25 mg) by mouth daily 30 tablet 1     spironolactone (ALDACTONE) 25 MG tablet TAKE 1 TABLET BY MOUTH TWICE DAILY, WITH MEALS 60 tablet 4     valACYclovir (VALTREX) 500 MG tablet Take 1 tablet (500 mg) by mouth 2 times daily 6 tablet 3            Allergies:     Allergies: Sulfa drugs          Past Medical History:     Past Medical History:   Diagnosis Date     Cystic acne 2/12/2021            Past Surgical History:     Past Surgical History:   Procedure Laterality Date      ADENOIDECTOMY       C RAD RESEC TONSIL/PILLARS       COLONOSCOPY N/A 4/9/2021    Procedure: COLONOSCOPY;  Surgeon: Sd Vaca MD;  Location: [facility] OR       ENT family history reviewed         Social History:     Social History     Tobacco Use     Smoking status: Never Smoker     Smokeless tobacco: Never Used   Substance Use Topics     Alcohol use: Yes     Comment: occasionally     Drug use: Never            Review of Systems:     ROS: See HPI         Physical Exam:     /60 (BP Location: Right arm, Patient Position: Sitting, Cuff Size: Adult Regular)   Pulse 77   Temp 97.4  F (36.3  C) (Tympanic)   Wt 61.2 kg (135 lb)   SpO2 98%   BMI 25.09 kg/m      General - The patient is well nourished and well developed, and appears to have good nutritional status.  Alert and oriented to person and place, answers questions and cooperates with examination appropriately.   Head and Face - Normocephalic and atraumatic, with no gross asymmetry noted.  The facial nerve is intact, with strong symmetric movements.  Skin - the midline scalp, parietal lobe has well approximated incision with sutures intact.  There is crusting that was removed with 1/2 peroxide 1/2 tap water cleansing.  There is tenderness to the incision without fluctuation.  There was scant purulent drainage that I was able to express.  The right temporal lobe incision is well approximated, tenderness to palpation, no fluctuation or drainage.   Voice and Breathing - The patient was breathing comfortably without the use of accessory muscles. There was no wheezing, stridor. The patients voice was clear and strong, and had appropriate pitch and quality.  Nose - External contour is symmetric, no gross deflection or scars.           Assessment and Plan:       ICD-10-CM    1. Local infection of skin and subcutaneous tissue  L08.9 mupirocin (BACTROBAN) 2 % external ointment     cephALEXin (KEFLEX) 500 MG capsule     Very scant purulence noted from the  midline scalp wound with milking it.  The surrounding skin is non-tender, no fluctuance.  Will start with topical mupirocin.  She will have someone clean it for her with peroxide and water for a few days, then resume soap and water.  I have given her a prescription for keflex to fill if symptoms worsen.  She will follow up for suture removal next week.      Kirstin KRAFT  [facility] ENT        Again, thank you for allowing me to participate in the care of your patient.        Sincerely,        Kirstin Haynes NP

## 2021-08-13 NOTE — PATIENT INSTRUCTIONS
Wash with 1/2 peroxide, 1/2 water, 2 times per day for 2-3 days, then back to washing with mild soap and water.   Apply bactroban as prescribed  If no improvement or worsening over the weekend, start oral keflex.       Thank you for allowing Kirstin KRAFT and our ENT team to participate in your care.  If your medications are too expensive, please call my nurse at the number listed below.  We can possibly change this medication.    If you have a scheduling or an appointment question please contact our Health Unit Coordinator at their direct line [phone] ext [phone]  ALL nursing questions or concerns can be directed to my Nurse Julisa [phone].

## 2021-08-13 NOTE — PROGRESS NOTES
Otolaryngology Note         Chief Complaint:     Patient presents with:  Wound Check: Scalp lesion            History of Present Illness:     Obie Norris is a 26 year old female seen today for a wound check.  She had 2 lesions removed from her scalp by Dr Alcantara on 8/11.   She reports increased tenderness.  No fevers or adrienne purulence.  She has been trying to wash with mild soap and water but state it is difficult to see.  She took ibuprofen 800 mg today - right before her appointment.  She otherwise has not taken anything for pain.           Medications:     Current Outpatient Rx   Medication Sig Dispense Refill     cephALEXin (KEFLEX) 500 MG capsule Take 1 capsule (500 mg) by mouth 2 times daily for 7 days 14 capsule 0     clindamycin (CLEOCIN T) 1 % external solution Apply to face bid for acne 60 mL 11     metFORMIN (GLUCOPHAGE-XR) 500 MG 24 hr tablet TAKE 1 TABLET BY MOUTH TWICE DAILY, WITH MEALS 60 tablet 5     mupirocin (BACTROBAN) 2 % external ointment Apply topically 3 times daily for 7 days 15 g 1     norethindrone (MICRONOR) 0.35 MG tablet Take 1 tablet (0.35 mg) by mouth 2 times daily (with meals) 56 tablet 11     sertraline (ZOLOFT) 25 MG tablet Take 1 tablet (25 mg) by mouth daily 30 tablet 1     spironolactone (ALDACTONE) 25 MG tablet TAKE 1 TABLET BY MOUTH TWICE DAILY, WITH MEALS 60 tablet 4     valACYclovir (VALTREX) 500 MG tablet Take 1 tablet (500 mg) by mouth 2 times daily 6 tablet 3            Allergies:     Allergies: Sulfa drugs          Past Medical History:     Past Medical History:   Diagnosis Date     Cystic acne 2/12/2021            Past Surgical History:     Past Surgical History:   Procedure Laterality Date     ADENOIDECTOMY       C RAD RESEC TONSIL/PILLARS       COLONOSCOPY N/A 4/9/2021    Procedure: COLONOSCOPY;  Surgeon: Sd Vaca MD;  Location: [facility] OR       ENT family history reviewed         Social History:     Social History     Tobacco Use     Smoking  status: Never Smoker     Smokeless tobacco: Never Used   Substance Use Topics     Alcohol use: Yes     Comment: occasionally     Drug use: Never            Review of Systems:     ROS: See HPI         Physical Exam:     /60 (BP Location: Right arm, Patient Position: Sitting, Cuff Size: Adult Regular)   Pulse 77   Temp 97.4  F (36.3  C) (Tympanic)   Wt 61.2 kg (135 lb)   SpO2 98%   BMI 25.09 kg/m      General - The patient is well nourished and well developed, and appears to have good nutritional status.  Alert and oriented to person and place, answers questions and cooperates with examination appropriately.   Head and Face - Normocephalic and atraumatic, with no gross asymmetry noted.  The facial nerve is intact, with strong symmetric movements.  Skin - the midline scalp, parietal lobe has well approximated incision with sutures intact.  There is crusting that was removed with 1/2 peroxide 1/2 tap water cleansing.  There is tenderness to the incision without fluctuation.  There was scant purulent drainage that I was able to express.  The right temporal lobe incision is well approximated, tenderness to palpation, no fluctuation or drainage.   Voice and Breathing - The patient was breathing comfortably without the use of accessory muscles. There was no wheezing, stridor. The patients voice was clear and strong, and had appropriate pitch and quality.  Nose - External contour is symmetric, no gross deflection or scars.           Assessment and Plan:       ICD-10-CM    1. Local infection of skin and subcutaneous tissue  L08.9 mupirocin (BACTROBAN) 2 % external ointment     cephALEXin (KEFLEX) 500 MG capsule     Very scant purulence noted from the midline scalp wound with milking it.  The surrounding skin is non-tender, no fluctuance.  Will start with topical mupirocin.  She will have someone clean it for her with peroxide and water for a few days, then resume soap and water.  I have given her a prescription for  keflex to fill if symptoms worsen.  She will follow up for suture removal next week.      Kirstin RAMIREZC  [facility] ENT

## 2021-08-13 NOTE — NURSING NOTE
"Chief Complaint   Patient presents with     Wound Check     Scalp lesion        Initial /60 (BP Location: Right arm, Patient Position: Sitting, Cuff Size: Adult Regular)   Pulse 77   Temp 97.4  F (36.3  C) (Tympanic)   Wt 61.2 kg (135 lb)   SpO2 98%   BMI 25.09 kg/m   Estimated body mass index is 25.09 kg/m  as calculated from the following:    Height as of 8/11/21: 1.562 m (5' 1.5\").    Weight as of this encounter: 61.2 kg (135 lb).  Medication Reconciliation: complete  Lulu Webb LPN  "

## 2021-08-16 LAB
PATH REPORT.COMMENTS IMP SPEC: NORMAL
PATH REPORT.COMMENTS IMP SPEC: NORMAL
PATH REPORT.FINAL DX SPEC: NORMAL
PATH REPORT.GROSS SPEC: NORMAL
PATH REPORT.MICROSCOPIC SPEC OTHER STN: NORMAL
PATH REPORT.RELEVANT HX SPEC: NORMAL
PHOTO IMAGE: NORMAL

## 2021-08-18 ENCOUNTER — ALLIED HEALTH/NURSE VISIT (OUTPATIENT)
Dept: OTOLARYNGOLOGY | Facility: OTHER | Age: 26
End: 2021-08-18
Attending: OTOLARYNGOLOGY
Payer: COMMERCIAL

## 2021-08-18 DIAGNOSIS — Z48.02 ENCOUNTER FOR REMOVAL OF SUTURES: Primary | ICD-10-CM

## 2021-08-18 NOTE — PROGRESS NOTES
This patient presents today for a suture removal. She had 2 scalp lesions removed by Dr. Alcantara on 8/11/21. Pathology revealed that both were benign. All sutures were removed without difficulty. The areas were cleaned and appear intact. No drainage present.

## 2021-10-04 ENCOUNTER — OFFICE VISIT (OUTPATIENT)
Dept: FAMILY MEDICINE | Facility: OTHER | Age: 26
End: 2021-10-04
Attending: NURSE PRACTITIONER
Payer: COMMERCIAL

## 2021-10-04 VITALS
RESPIRATION RATE: 16 BRPM | SYSTOLIC BLOOD PRESSURE: 100 MMHG | HEART RATE: 78 BPM | OXYGEN SATURATION: 99 % | BODY MASS INDEX: 25.91 KG/M2 | WEIGHT: 139.4 LBS | DIASTOLIC BLOOD PRESSURE: 60 MMHG | TEMPERATURE: 98.2 F

## 2021-10-04 DIAGNOSIS — E28.2 PCOS (POLYCYSTIC OVARIAN SYNDROME): ICD-10-CM

## 2021-10-04 DIAGNOSIS — L70.0 CYSTIC ACNE: Primary | ICD-10-CM

## 2021-10-04 PROCEDURE — 99213 OFFICE O/P EST LOW 20 MIN: CPT | Performed by: NURSE PRACTITIONER

## 2021-10-04 RX ORDER — CEPHALEXIN 500 MG/1
500 CAPSULE ORAL 3 TIMES DAILY
Qty: 30 CAPSULE | Refills: 0 | Status: SHIPPED | OUTPATIENT
Start: 2021-10-04 | End: 2021-10-14

## 2021-10-04 RX ORDER — TRAZODONE HYDROCHLORIDE 50 MG/1
TABLET, FILM COATED ORAL
COMMUNITY
Start: 2021-08-24 | End: 2024-01-04

## 2021-10-04 RX ORDER — SPIRONOLACTONE 25 MG/1
TABLET ORAL
Qty: 60 TABLET | Refills: 4 | Status: SHIPPED | OUTPATIENT
Start: 2021-10-04 | End: 2022-01-24

## 2021-10-04 ASSESSMENT — PAIN SCALES - GENERAL: PAINLEVEL: NO PAIN (0)

## 2021-10-04 NOTE — NURSING NOTE
"Chief Complaint   Patient presents with     Derm Problem       Initial /60 (BP Location: Left arm, Patient Position: Sitting, Cuff Size: Adult Regular)   Pulse 78   Temp 98.2  F (36.8  C) (Tympanic)   Resp 16   Wt 63.2 kg (139 lb 6.4 oz)   SpO2 99%   BMI 25.91 kg/m   Estimated body mass index is 25.91 kg/m  as calculated from the following:    Height as of 8/11/21: 1.562 m (5' 1.5\").    Weight as of this encounter: 63.2 kg (139 lb 6.4 oz).  Medication Reconciliation: complete  Perri Wild LPN  "

## 2021-10-04 NOTE — PROGRESS NOTES
Assessment & Plan       Cystic acne  - Continue plan of care   - cephALEXin (KEFLEX) 500 MG capsule; Take 1 capsule (500 mg) by mouth 3 times daily for 10 days  - Warm moist heat    Aldactone Refill sent      Ana Toribio CNP  [facility] - REBEKA Ragland is a 26 year old who presents for the following health issues       Cystic Acne  Onset/Duration: 2 weeks  Description  Location: on right cheek  Character: round, raised, draining, red  Itching: no  Intensity:  0/10  Progression of Symptoms:  improving  Accompanying signs and symptoms:   Fever: no  Body aches or joint pain: no  Sore throat symptoms: no  Recent cold symptoms: no  History:           Previous episodes of similar rash: yes  New exposures:  None  Recent travel: no  Exposure to similar rash: no  Precipitating or alleviating factors: none  Therapies tried and outcome: Bactroban, clindamycin and cedofil wash            Patient Active Problem List   Diagnosis     DUB (dysfunctional uterine bleeding)-always--12/2018     PCOS (polycystic ovarian syndrome)--clinical signs and syptoms--12/2018     Irritable bowel syndrome with loose stools--IBS diet,Dicyclomine--12/2018     Cystic acne     Past Surgical History:   Procedure Laterality Date     ADENOIDECTOMY       C RAD RESEC TONSIL/PILLARS       COLONOSCOPY N/A 4/9/2021    Procedure: COLONOSCOPY;  Surgeon: Sd Vaca MD;  Location: [facility] OR       Social History     Tobacco Use     Smoking status: Never Smoker     Smokeless tobacco: Never Used   Substance Use Topics     Alcohol use: Yes     Comment: occasionally     Family History   Problem Relation Age of Onset     Colorectal Cancer Mother      Throat cancer Father      Heart Disease Maternal Grandfather      Rheumatoid Arthritis Sister      Diabetes Other      Dementia Other            Current Outpatient Medications   Medication Sig Dispense Refill     clindamycin (CLEOCIN T) 1 % external solution Apply to face bid for acne 60  mL 11     metFORMIN (GLUCOPHAGE-XR) 500 MG 24 hr tablet TAKE 1 TABLET BY MOUTH TWICE DAILY, WITH MEALS 60 tablet 5     norethindrone (MICRONOR) 0.35 MG tablet Take 1 tablet (0.35 mg) by mouth 2 times daily (with meals) 56 tablet 11     sertraline (ZOLOFT) 50 MG tablet Take 50 mg by mouth daily       spironolactone (ALDACTONE) 25 MG tablet TAKE 1 TABLET BY MOUTH TWICE DAILY, WITH MEALS 60 tablet 4     traZODone (DESYREL) 50 MG tablet TAKE 1/2 TO 1 TABLET BY MOUTH AT BEDTIME AS NEEDED FOR SLEEP       valACYclovir (VALTREX) 500 MG tablet Take 1 tablet (500 mg) by mouth 2 times daily 6 tablet 3       Allergies   Allergen Reactions     Sulfa Drugs Hives       Recent Labs   Lab Test 07/09/21  1329 02/22/21  1441 07/08/20  1233 11/06/19  1305 12/19/18  1929   ALT  --   --   --   --  20   CR  --   --  0.71  --  0.73   GFRESTIMATED  --   --  >90  --  >90   GFRESTBLACK  --   --  >90  --  >90   POTASSIUM  --   --  3.8  --  3.9   TSH 1.42 1.65  --    < > 1.14    < > = values in this interval not displayed.        BP Readings from Last 3 Encounters:   10/04/21 100/60   08/13/21 110/60   08/11/21 110/60    Wt Readings from Last 3 Encounters:   10/04/21 63.2 kg (139 lb 6.4 oz)   08/13/21 61.2 kg (135 lb)   08/11/21 62.6 kg (138 lb)              Review of Systems   Constitutional, HEENT, cardiovascular, pulmonary, gi and gu systems are negative, except as otherwise noted.        Objective    /60 (BP Location: Left arm, Patient Position: Sitting, Cuff Size: Adult Regular)   Pulse 78   Temp 98.2  F (36.8  C) (Tympanic)   Resp 16   Wt 63.2 kg (139 lb 6.4 oz)   SpO2 99%   BMI 25.91 kg/m    Body mass index is 25.91 kg/m .         Physical Exam   GENERAL: healthy, alert and no distress  NECK: no adenopathy, no asymmetry, masses, or scars and thyroid normal to palpation  RESP: lungs clear to auscultation - no rales, rhonchi or wheezes  CV: regular rate and rhythm, normal S1 S2, no S3 or S4, no murmur, click or rub, no  peripheral edema and peripheral pulses strong  SKIN: Cystic acne - right nasolabial area - 4 mm firm erythematous, tender to touch  PSYCH: mentation appears normal, affect normal/bright

## 2021-10-10 ENCOUNTER — HEALTH MAINTENANCE LETTER (OUTPATIENT)
Age: 26
End: 2021-10-10

## 2021-10-21 ENCOUNTER — LAB (OUTPATIENT)
Dept: OCCUPATIONAL MEDICINE | Facility: OTHER | Age: 26
End: 2021-10-21

## 2021-10-25 ENCOUNTER — APPOINTMENT (OUTPATIENT)
Dept: OCCUPATIONAL MEDICINE | Facility: OTHER | Age: 26
End: 2021-10-25

## 2021-10-26 ENCOUNTER — TELEPHONE (OUTPATIENT)
Dept: FAMILY MEDICINE | Facility: OTHER | Age: 26
End: 2021-10-26

## 2021-10-26 NOTE — TELEPHONE ENCOUNTER
Patient is requesting an overbook for tomorrow 10/27/2021. Need to be seen in clinic.    covid test negative x2.  Onset 1 week ago. Sx include- Headache, cough, fatigue, congestion.     Please advise.   JESSICA GARCIA LPN

## 2021-10-27 ENCOUNTER — OFFICE VISIT (OUTPATIENT)
Dept: FAMILY MEDICINE | Facility: OTHER | Age: 26
End: 2021-10-27
Attending: NURSE PRACTITIONER
Payer: COMMERCIAL

## 2021-10-27 VITALS
WEIGHT: 139 LBS | TEMPERATURE: 98.8 F | OXYGEN SATURATION: 98 % | SYSTOLIC BLOOD PRESSURE: 102 MMHG | RESPIRATION RATE: 21 BRPM | DIASTOLIC BLOOD PRESSURE: 66 MMHG | BODY MASS INDEX: 25.84 KG/M2 | HEART RATE: 72 BPM

## 2021-10-27 DIAGNOSIS — J01.90 ACUTE SINUSITIS WITH SYMPTOMS > 10 DAYS: Primary | ICD-10-CM

## 2021-10-27 PROCEDURE — 99213 OFFICE O/P EST LOW 20 MIN: CPT | Performed by: NURSE PRACTITIONER

## 2021-10-27 RX ORDER — FLUTICASONE PROPIONATE 50 MCG
1 SPRAY, SUSPENSION (ML) NASAL DAILY
Qty: 16 G | Refills: 3 | Status: SHIPPED | OUTPATIENT
Start: 2021-10-27 | End: 2022-04-06

## 2021-10-27 ASSESSMENT — PAIN SCALES - GENERAL: PAINLEVEL: NO PAIN (0)

## 2021-10-27 NOTE — PROGRESS NOTES
"  Assessment & Plan     Acute sinusitis with symptoms > 10 days  2 negative COVID test.  Discussed viral vs bacterial.  With her symptoms > 1 weeks and worsening plan to treat for sinusitis and discussed self sinus care.  Provided her information on self sinus care.  She is encouraged to follow up if no improvement or worsening symptoms.  - fluticasone (FLONASE) 50 MCG/ACT nasal spray; Spray 1 spray into both nostrils daily  - amoxicillin-clavulanate (AUGMENTIN) 875-125 MG tablet; Take 1 tablet by mouth 2 times daily    Recent deer tick on less then 24 hours. Per guidelines does not need treatment with know tick bite at this time.  She is being treated for sinus infection.  If any concerns for lyme disease she can follow up in the future.         BMI:   Estimated body mass index is 25.84 kg/m  as calculated from the following:    Height as of 8/11/21: 1.562 m (5' 1.5\").    Weight as of this encounter: 63 kg (139 lb).       See Patient Instructions    No follow-ups on file.    EDWARD Byers [facility] - [place] Ragland is a 26 year old who presents for the following health issues     HPI     Acute Illness  Acute illness concerns: menstrual cycle for 13 days - does not normally have a menstrual cycle, sinus congestion, headaches  Onset/Duration: over One week ago  Symptoms:  Fever: no  Chills/Sweats: YES  Headache (location?): no  Sinus Pressure: YES  Conjunctivitis:  no  Ear Pain: YES: left  Rhinorrhea: no  Congestion: YES  Sore Throat: no  Cough: YES-productive of clear sputum, productive of green sputum  Wheeze: no  Decreased Appetite: no  Nausea: no  Vomiting: no  Diarrhea: no  Dysuria/Freq.: no  Dysuria or Hematuria: no  Fatigue/Achiness: YES  Sick/Strep Exposure: YES  Therapies tried and outcome: tylenol ibuprofen mucinex 12 hr, mucinex day and night, emergenC    Had two covid test which were Negitive      Review of Systems   CONSTITUTIONAL:chills, faigue and " sweats  INTEGUMENTARY/SKIN: deer tick bite yesterday <24 hours   EYES: NEGATIVE for vision changes or irritation  ENT/MOUTH: ear pain bilateral, postnasal drainage and sinus pressure  RESP:cough - white, green, yellow productive   CV: NEGATIVE for chest pain, palpitations or peripheral edema  GI: NEGATIVE for nausea, abdominal pain, heartburn, or change in bowel habits  : vaginal bleeding and denies dysuria   MUSCULOSKELETAL: body aches  NEURO:generalized weakness and fatigue       Objective    /66 (BP Location: Right arm, Patient Position: Chair, Cuff Size: Adult Regular)   Pulse 72   Temp 98.8  F (37.1  C) (Tympanic)   Resp 21   Wt 63 kg (139 lb)   SpO2 98%   BMI 25.84 kg/m    Body mass index is 25.84 kg/m .  Physical Exam   GENERAL: alert and no distress  EYES: watery drainage bilateral eyes   HENT: normal cephalic/atraumatic, ear canals and TM's normal, nose and mouth without ulcers or lesions, oropharynx clear, oral mucous membranes moist and sinuses: maxillary, frontal tenderness on both sides  RESP: lungs clear to auscultation - no rales, rhonchi or wheezes  CV: regular rate and rhythm, normal S1 S2, no S3 or S4, no murmur, click or rub, no peripheral edema and peripheral pulses strong  ABDOMEN: soft, nontender, no hepatosplenomegaly, no masses and bowel sounds normal  NEURO: Normal strength and tone, mentation intact and speech normal  PSYCH: mentation appears normal, affect normal/bright  LYMPH: normal ant/post cervical, supraclavicular nodes

## 2021-10-27 NOTE — NURSING NOTE
"Chief Complaint   Patient presents with     URI       Initial /66 (BP Location: Right arm, Patient Position: Chair, Cuff Size: Adult Regular)   Pulse 72   Temp 98.8  F (37.1  C) (Tympanic)   Resp 21   Wt 63 kg (139 lb)   SpO2 98%   BMI 25.84 kg/m   Estimated body mass index is 25.84 kg/m  as calculated from the following:    Height as of 8/11/21: 1.562 m (5' 1.5\").    Weight as of this encounter: 63 kg (139 lb).  Medication Reconciliation: complete  Lina Cabral LPN  "

## 2021-10-27 NOTE — PATIENT INSTRUCTIONS
Patient Education     Self-Care for Sinusitis  Sinusitis can often be managed with self-care. Self-care can keep sinuses moist and make you feel more comfortable. Remember to follow your doctor's instructions closely. This can make a big difference in getting your sinus problem under control.   Drink fluids  Drinking extra fluids helps thin your mucus. This lets it drain from your sinuses more easily. Have a glass of water every hour or two. A humidifier helps in much the same way. Fluids can also offset the drying effects of certain medicines. If you use a humidifier, follow the product maker's instructions on how to use it. Clean it on a regular schedule.      Drinking plenty of water can help sinuses drain.   Use saltwater rinses  Rinses help keep your sinuses and nose moist. Mix a teaspoon of salt in 8 ounces of fresh, warm water. Use a bulb syringe to gently squirt the water into your nose a few times a day. You can also buy ready-made saline nasal sprays.   Apply hot or cold packs  Applying heat to the area surrounding your sinuses may make you feel more comfortable. Use a hot water bottle or a hand towel dipped in hot water. Some people also find ice packs effective for relieving pain.   Medicines  Your doctor may prescribe medicines to help treat your sinusitis. If you have an infection, antibiotics can help clear it up. If you are prescribed antibiotics, take all pills on schedule until they are gone, even if you feel better. Decongestants help relieve swelling. Use decongestant sprays for short periods only under the direction of your healthcare provider. If you have allergies, your doctor may prescribe medicines to help relieve them.   [employer] last reviewed this educational content on 9/1/2019 2000-2021 [employer]. All rights reserved. This information is not intended as a substitute for professional medical care. Always follow your healthcare professional's instructions.

## 2021-11-08 ENCOUNTER — OFFICE VISIT (OUTPATIENT)
Dept: FAMILY MEDICINE | Facility: OTHER | Age: 26
End: 2021-11-08
Attending: NURSE PRACTITIONER
Payer: COMMERCIAL

## 2021-11-08 VITALS
BODY MASS INDEX: 25.03 KG/M2 | SYSTOLIC BLOOD PRESSURE: 118 MMHG | HEART RATE: 84 BPM | TEMPERATURE: 97.7 F | WEIGHT: 136 LBS | HEIGHT: 62 IN | DIASTOLIC BLOOD PRESSURE: 68 MMHG | OXYGEN SATURATION: 97 %

## 2021-11-08 DIAGNOSIS — N89.8 VAGINAL DISCHARGE: Primary | ICD-10-CM

## 2021-11-08 DIAGNOSIS — B37.31 CANDIDIASIS OF VAGINA: ICD-10-CM

## 2021-11-08 DIAGNOSIS — N89.8 VAGINAL ITCHING: ICD-10-CM

## 2021-11-08 LAB
CLUE CELLS: ABNORMAL
TRICHOMONAS, WET PREP: ABNORMAL
WBC'S/HIGH POWER FIELD, WET PREP: ABNORMAL
YEAST, WET PREP: PRESENT

## 2021-11-08 PROCEDURE — 99213 OFFICE O/P EST LOW 20 MIN: CPT | Performed by: NURSE PRACTITIONER

## 2021-11-08 PROCEDURE — 87210 SMEAR WET MOUNT SALINE/INK: CPT | Performed by: NURSE PRACTITIONER

## 2021-11-08 RX ORDER — FLUCONAZOLE 150 MG/1
150 TABLET ORAL DAILY
Qty: 3 TABLET | Refills: 0 | Status: SHIPPED | OUTPATIENT
Start: 2021-11-08 | End: 2021-11-11

## 2021-11-08 ASSESSMENT — PAIN SCALES - GENERAL: PAINLEVEL: SEVERE PAIN (7)

## 2021-11-08 ASSESSMENT — MIFFLIN-ST. JEOR: SCORE: 1297.2

## 2021-11-08 NOTE — NURSING NOTE
"Chief Complaint   Patient presents with     Vaginal Problem     itching and discharge  x 1 week        Initial /68 (BP Location: Right arm, Patient Position: Chair, Cuff Size: Adult Regular)   Pulse 84   Temp 97.7  F (36.5  C) (Tympanic)   Ht 1.562 m (5' 1.5\")   Wt 61.7 kg (136 lb)   LMP 10/15/2021   SpO2 97%   BMI 25.28 kg/m   Estimated body mass index is 25.28 kg/m  as calculated from the following:    Height as of this encounter: 1.562 m (5' 1.5\").    Weight as of this encounter: 61.7 kg (136 lb).  Medication Reconciliation: complete  Aixa Ravi LPN  "

## 2021-11-08 NOTE — PROGRESS NOTES
"  Assessment & Plan     Vaginal discharge  Vaginal itching  Candidiasis of vagina  Wet Prep with yeast. Will treat with Diflucan 150 mg times 3 days. She was made aware of the side effects. Will return with new or worsening symptoms.     956}     BMI:   Estimated body mass index is 25.28 kg/m  as calculated from the following:    Height as of this encounter: 1.562 m (5' 1.5\").    Weight as of this encounter: 61.7 kg (136 lb).       Mattie Meadows NP  [facility] - [place] Ragland is a 26 year old who presents for the following health issues     HPI     Vaginal Symptoms  Onset/Duration:   1 week   Description:  Vaginal Discharge: creamy   Itching (Pruritis): YES  Burning sensation:  YES  Odor: no  Accompanying Signs & Symptoms:  Urinary symptoms: no  Abdominal pain: no  Fever: no  History:   Sexually active: YES, no concern about STDs  New Partner: no  Possibility of Pregnancy:  no  Recent antibiotic use: YES, Augmentin-10 days and keflex -10 days  Previous vaginitis issues: no  Precipitating or alleviating factors: possible ABX use   Therapies tried and outcome: none  No chest pain or shortness of breath.   No pelvic pain. No dysuria. No hematuria.         Review of Systems   As noted in the HPI.       Objective    /68 (BP Location: Right arm, Patient Position: Chair, Cuff Size: Adult Regular)   Pulse 84   Temp 97.7  F (36.5  C) (Tympanic)   Ht 1.562 m (5' 1.5\")   Wt 61.7 kg (136 lb)   LMP 10/15/2021   SpO2 97%   BMI 25.28 kg/m    Body mass index is 25.28 kg/m .  Physical Exam   GENERAL: healthy, alert and no distress  EYES: Eyes grossly normal to inspection, PERRL and conjunctivae and sclerae normal  HENT: ear canals and TM's normal, nose and mouth without ulcers or lesions  NECK: no adenopathy, no asymmetry, masses, or scars and thyroid normal to palpation  RESP: lungs clear to auscultation - no rales, rhonchi or wheezes  CV: regular rate and rhythm, no murmur, click or " rub, no peripheral edema  ABDOMEN: soft, nontender, no masses and bowel sounds normal   (female): normal female external genitalia, normal urethral meatus, vaginal mucosa, normal cervix/adnexa/uterus without masses, some thick white vaginal discharge   NEURO: Normal strength and tone, mentation intact and speech normal  PSYCH: mentation appears normal, affect normal/bright    Results for orders placed or performed in visit on 11/08/21   Wet prep     Status: Abnormal    Specimen: Vagina; Swab   Result Value Ref Range    Trichomonas Absent Absent    Yeast Present (A) Absent    Clue Cells Absent Absent    WBCs/high power field 2+ (A) None

## 2021-11-15 ENCOUNTER — OFFICE VISIT (OUTPATIENT)
Dept: FAMILY MEDICINE | Facility: OTHER | Age: 26
End: 2021-11-15
Attending: NURSE PRACTITIONER
Payer: COMMERCIAL

## 2021-11-15 VITALS
BODY MASS INDEX: 25.84 KG/M2 | OXYGEN SATURATION: 98 % | DIASTOLIC BLOOD PRESSURE: 62 MMHG | SYSTOLIC BLOOD PRESSURE: 112 MMHG | HEART RATE: 92 BPM | WEIGHT: 139 LBS | TEMPERATURE: 98.8 F | RESPIRATION RATE: 18 BRPM

## 2021-11-15 DIAGNOSIS — N89.8 VAGINAL IRRITATION: Primary | ICD-10-CM

## 2021-11-15 PROCEDURE — 87491 CHLMYD TRACH DNA AMP PROBE: CPT | Performed by: NURSE PRACTITIONER

## 2021-11-15 PROCEDURE — 99213 OFFICE O/P EST LOW 20 MIN: CPT | Performed by: NURSE PRACTITIONER

## 2021-11-15 PROCEDURE — 87591 N.GONORRHOEAE DNA AMP PROB: CPT | Performed by: NURSE PRACTITIONER

## 2021-11-15 RX ORDER — FLUCONAZOLE 150 MG/1
150 TABLET ORAL DAILY
Qty: 3 TABLET | Refills: 0 | Status: SHIPPED | OUTPATIENT
Start: 2021-11-15 | End: 2021-11-18

## 2021-11-15 ASSESSMENT — PAIN SCALES - GENERAL: PAINLEVEL: NO PAIN (0)

## 2021-11-15 NOTE — NURSING NOTE
"No chief complaint on file.      Initial /62 (BP Location: Left arm, Patient Position: Sitting, Cuff Size: Adult Regular)   Pulse 92   Temp 98.8  F (37.1  C) (Tympanic)   Resp 18   Wt 63 kg (139 lb)   SpO2 98%   BMI 25.84 kg/m   Estimated body mass index is 25.84 kg/m  as calculated from the following:    Height as of 11/8/21: 1.562 m (5' 1.5\").    Weight as of this encounter: 63 kg (139 lb).  Medication Reconciliation: complete  Parviz Love LPN    "

## 2021-11-15 NOTE — PROGRESS NOTES
Assessment & Plan     Vaginal irritation  Continues to have itchy vaginal discharge.  Plan to retreat for yeast infection.  If symptoms continue consider repeating wet prep.  - GC/Chlamydia by PCR - [facility]; Future  - fluconazole (DIFLUCAN) 150 MG tablet; Take 1 tablet (150 mg) by mouth daily for 3 days  - GC/Chlamydia by PCR - [facility]       See Patient Instructions    No follow-ups on file.    EDWARD Byers [facility] - [place] Ragland is a 26 year old who presents for the following health issues     HPI     Concern - Follow up  Onset: 2 weeks  Description: itching   Intensity: mild  Progression of Symptoms:  improving  Accompanying Signs & Symptoms: none  Previous history of similar problem: none  Precipitating factors:        Worsened by: none  Alleviating factors:        Improved by: none  Therapies tried and outcome: diflucan  Previous vaginal yeast infection         Review of Systems   CONSTITUTIONAL: NEGATIVE for fever, chills, change in weight  RESP: NEGATIVE for significant cough or SOB  CV: NEGATIVE for chest pain, palpitations or peripheral edema  GI: NEGATIVE for nausea, abdominal pain, heartburn, or change in bowel habits  : vaginal/recatal irritation       Objective    /62 (BP Location: Left arm, Patient Position: Sitting, Cuff Size: Adult Regular)   Pulse 92   Temp 98.8  F (37.1  C) (Tympanic)   Resp 18   Wt 63 kg (139 lb)   SpO2 98%   BMI 25.84 kg/m    Body mass index is 25.84 kg/m .  Physical Exam   GENERAL: healthy, alert and no distress  RESP: regular non-labored   CV: regular rates, peripheral pulses strong and no peripheral edema  ABDOMEN: soft, nontender, no hepatosplenomegaly, no masses and bowel sounds normal   (female): normal female external genitalia and vaginal discharge - moderate, white, thick and milky    Office Visit on 11/08/2021   Component Date Value Ref Range Status     Trichomonas 11/08/2021 Absent  Absent Final      Yeast 11/08/2021 Present* Absent Final     Clue Cells 11/08/2021 Absent  Absent Final     WBCs/high power field 11/08/2021 2+* None Final

## 2021-11-16 LAB
C TRACH DNA SPEC QL PROBE+SIG AMP: NEGATIVE
N GONORRHOEA DNA SPEC QL NAA+PROBE: NEGATIVE

## 2021-12-07 ENCOUNTER — APPOINTMENT (OUTPATIENT)
Dept: OCCUPATIONAL MEDICINE | Facility: OTHER | Age: 26
End: 2021-12-07

## 2021-12-21 ENCOUNTER — APPOINTMENT (OUTPATIENT)
Dept: OCCUPATIONAL MEDICINE | Facility: OTHER | Age: 26
End: 2021-12-21

## 2022-01-15 ENCOUNTER — LAB REQUISITION (OUTPATIENT)
Dept: LAB | Facility: HOSPITAL | Age: 27
End: 2022-01-15
Payer: COMMERCIAL

## 2022-01-15 ENCOUNTER — LAB (OUTPATIENT)
Dept: OCCUPATIONAL MEDICINE | Facility: OTHER | Age: 27
End: 2022-01-15

## 2022-01-15 LAB
FLUAV RNA SPEC QL NAA+PROBE: NEGATIVE
FLUBV RNA RESP QL NAA+PROBE: NEGATIVE
RSV RNA SPEC NAA+PROBE: NEGATIVE
SARS-COV-2 RNA RESP QL NAA+PROBE: POSITIVE

## 2022-01-15 PROCEDURE — 87637 SARSCOV2&INF A&B&RSV AMP PRB: CPT | Performed by: FAMILY MEDICINE

## 2022-01-21 DIAGNOSIS — E28.2 PCOS (POLYCYSTIC OVARIAN SYNDROME): ICD-10-CM

## 2022-01-24 RX ORDER — SPIRONOLACTONE 25 MG/1
TABLET ORAL
Qty: 60 TABLET | Refills: 0 | Status: SHIPPED | OUTPATIENT
Start: 2022-01-24 | End: 2022-03-09

## 2022-01-24 NOTE — TELEPHONE ENCOUNTER
Spironolactone      Last Written Prescription Date:  11.8.21  Last Fill Quantity: #60,   # refills: 0  Last Office Visit: 11.15.21  Future Office visit:       Routing refill request to provider for review/approval because:

## 2022-02-03 DIAGNOSIS — J20.8 ACUTE BRONCHITIS DUE TO OTHER SPECIFIED ORGANISMS: Primary | ICD-10-CM

## 2022-02-03 RX ORDER — AZITHROMYCIN 250 MG/1
TABLET, FILM COATED ORAL
Qty: 6 TABLET | Refills: 0 | Status: SHIPPED | OUTPATIENT
Start: 2022-02-03 | End: 2022-02-17

## 2022-02-17 ENCOUNTER — LAB (OUTPATIENT)
Dept: LAB | Facility: OTHER | Age: 27
End: 2022-02-17
Attending: FAMILY MEDICINE
Payer: COMMERCIAL

## 2022-02-17 ENCOUNTER — OFFICE VISIT (OUTPATIENT)
Dept: FAMILY MEDICINE | Facility: OTHER | Age: 27
End: 2022-02-17
Attending: FAMILY MEDICINE
Payer: COMMERCIAL

## 2022-02-17 VITALS
WEIGHT: 138 LBS | SYSTOLIC BLOOD PRESSURE: 102 MMHG | DIASTOLIC BLOOD PRESSURE: 60 MMHG | TEMPERATURE: 99.4 F | OXYGEN SATURATION: 99 % | BODY MASS INDEX: 25.4 KG/M2 | HEART RATE: 98 BPM | HEIGHT: 62 IN

## 2022-02-17 DIAGNOSIS — R10.84 ABDOMINAL PAIN, GENERALIZED: ICD-10-CM

## 2022-02-17 DIAGNOSIS — R10.84 ABDOMINAL PAIN, GENERALIZED: Primary | ICD-10-CM

## 2022-02-17 LAB
ALBUMIN SERPL-MCNC: 3.7 G/DL (ref 3.4–5)
ALP SERPL-CCNC: 71 U/L (ref 40–150)
ALT SERPL W P-5'-P-CCNC: 17 U/L (ref 0–50)
ANION GAP SERPL CALCULATED.3IONS-SCNC: 3 MMOL/L (ref 3–14)
AST SERPL W P-5'-P-CCNC: 12 U/L (ref 0–45)
BASOPHILS # BLD AUTO: 0 10E3/UL (ref 0–0.2)
BASOPHILS NFR BLD AUTO: 0 %
BILIRUB SERPL-MCNC: 0.4 MG/DL (ref 0.2–1.3)
BUN SERPL-MCNC: 9 MG/DL (ref 7–30)
CALCIUM SERPL-MCNC: 8.4 MG/DL (ref 8.5–10.1)
CHLORIDE BLD-SCNC: 105 MMOL/L (ref 94–109)
CO2 SERPL-SCNC: 28 MMOL/L (ref 20–32)
CREAT SERPL-MCNC: 0.72 MG/DL (ref 0.52–1.04)
CRP SERPL-MCNC: 38.1 MG/L (ref 0–8)
EOSINOPHIL # BLD AUTO: 0 10E3/UL (ref 0–0.7)
EOSINOPHIL NFR BLD AUTO: 0 %
ERYTHROCYTE [DISTWIDTH] IN BLOOD BY AUTOMATED COUNT: 12.4 % (ref 10–15)
ERYTHROCYTE [SEDIMENTATION RATE] IN BLOOD BY WESTERGREN METHOD: 8 MM/HR (ref 0–20)
GFR SERPL CREATININE-BSD FRML MDRD: >90 ML/MIN/1.73M2
GLUCOSE BLD-MCNC: 94 MG/DL (ref 70–99)
HCG SERPL QL: NEGATIVE
HCT VFR BLD AUTO: 41.4 % (ref 35–47)
HGB BLD-MCNC: 14.2 G/DL (ref 11.7–15.7)
IMM GRANULOCYTES # BLD: 0 10E3/UL
IMM GRANULOCYTES NFR BLD: 1 %
LIPASE SERPL-CCNC: 104 U/L (ref 73–393)
LYMPHOCYTES # BLD AUTO: 0.6 10E3/UL (ref 0.8–5.3)
LYMPHOCYTES NFR BLD AUTO: 9 %
MCH RBC QN AUTO: 31.4 PG (ref 26.5–33)
MCHC RBC AUTO-ENTMCNC: 34.3 G/DL (ref 31.5–36.5)
MCV RBC AUTO: 92 FL (ref 78–100)
MONOCYTES # BLD AUTO: 0.5 10E3/UL (ref 0–1.3)
MONOCYTES NFR BLD AUTO: 8 %
NEUTROPHILS # BLD AUTO: 5.5 10E3/UL (ref 1.6–8.3)
NEUTROPHILS NFR BLD AUTO: 82 %
NRBC # BLD AUTO: 0 10E3/UL
NRBC BLD AUTO-RTO: 0 /100
PLATELET # BLD AUTO: 200 10E3/UL (ref 150–450)
POTASSIUM BLD-SCNC: 3.6 MMOL/L (ref 3.4–5.3)
PROT SERPL-MCNC: 6.7 G/DL (ref 6.8–8.8)
RBC # BLD AUTO: 4.52 10E6/UL (ref 3.8–5.2)
SODIUM SERPL-SCNC: 136 MMOL/L (ref 133–144)
WBC # BLD AUTO: 6.7 10E3/UL (ref 4–11)

## 2022-02-17 PROCEDURE — 85025 COMPLETE CBC W/AUTO DIFF WBC: CPT

## 2022-02-17 PROCEDURE — 83690 ASSAY OF LIPASE: CPT

## 2022-02-17 PROCEDURE — 86140 C-REACTIVE PROTEIN: CPT

## 2022-02-17 PROCEDURE — 36415 COLL VENOUS BLD VENIPUNCTURE: CPT

## 2022-02-17 PROCEDURE — 85652 RBC SED RATE AUTOMATED: CPT

## 2022-02-17 PROCEDURE — 80053 COMPREHEN METABOLIC PANEL: CPT

## 2022-02-17 PROCEDURE — 99214 OFFICE O/P EST MOD 30 MIN: CPT | Performed by: FAMILY MEDICINE

## 2022-02-17 PROCEDURE — 84703 CHORIONIC GONADOTROPIN ASSAY: CPT

## 2022-02-17 ASSESSMENT — PAIN SCALES - GENERAL: PAINLEVEL: NO PAIN (0)

## 2022-02-17 ASSESSMENT — ANXIETY QUESTIONNAIRES
GAD7 TOTAL SCORE: 1
3. WORRYING TOO MUCH ABOUT DIFFERENT THINGS: NOT AT ALL
2. NOT BEING ABLE TO STOP OR CONTROL WORRYING: NOT AT ALL
6. BECOMING EASILY ANNOYED OR IRRITABLE: SEVERAL DAYS
5. BEING SO RESTLESS THAT IT IS HARD TO SIT STILL: NOT AT ALL
IF YOU CHECKED OFF ANY PROBLEMS ON THIS QUESTIONNAIRE, HOW DIFFICULT HAVE THESE PROBLEMS MADE IT FOR YOU TO DO YOUR WORK, TAKE CARE OF THINGS AT HOME, OR GET ALONG WITH OTHER PEOPLE: NOT DIFFICULT AT ALL
7. FEELING AFRAID AS IF SOMETHING AWFUL MIGHT HAPPEN: NOT AT ALL
1. FEELING NERVOUS, ANXIOUS, OR ON EDGE: NOT AT ALL
4. TROUBLE RELAXING: NOT AT ALL

## 2022-02-17 ASSESSMENT — PATIENT HEALTH QUESTIONNAIRE - PHQ9: SUM OF ALL RESPONSES TO PHQ QUESTIONS 1-9: 6

## 2022-02-17 NOTE — PROGRESS NOTES
Assessment & Plan     Abdominal pain, generalized  Labs most are reassuring . CRP up some. Sx improving and not getting worse. Probably VGE or food poisoning doubt GYN/.  Did not r/o Appy. Discussed-- watch and wait vs CT of Abd/pelvis.  Will hold on further kuo since moving well and doing some better. Clear to full liquid diet and then ADAT if improves.  Sleep and tylenol. If worse- needs to be seen for CT scan. Pt agrees. Symptomatic treatment was discussed along when patient should call and/or come back into the clinic or go to ER/Urgent care. All questions answered.   - CRP inflammation; Future  - Comprehensive metabolic panel; Future  - CBC with Platelets & Differential; Future  - Erythrocyte sedimentation rate auto; Future  - Lipase; Future  - UA with Microscopic reflex to Culture; Future  - hCG Qualitative Pregnancy; Future  - LABELS; Future                 No follow-ups on file.    Dom Maradiaga MD  [facility] - [place] Ragland is a 26 year old who presents for the following health issues     HPI     Acute Illness  Acute illness concerns: abd pain, fevers  Onset/Duration: yesterday  Symptoms:  Fever: YES  Chills/Sweats: YES  Headache (location?): YES  Sinus Pressure: no  Conjunctivitis:  no  Ear Pain: no  Rhinorrhea: no  Congestion: no  Sore Throat: no  Cough: no  Wheeze: no  Decreased Appetite: YES  Nausea: YES  Vomiting: no  Diarrhea: YES  Dysuria/Freq.: no  Dysuria or Hematuria: no  Fatigue/Achiness: YES  Sick/Strep Exposure: no  Therapies tried and outcome: MOM    Started up in epigastric region now going to periumbilical / lower abdomen  No  or GYN sx  Thought constipated - took MOM . Having many stools/ no blood   Feels some better with pain in abdomen   Has night sweats and probable fever last night   Not hungry  No peritoneal sx-- moving well  Not sleep much - tired               Review of Systems   Constitutional, HEENT, cardiovascular, pulmonary, gi and gu  "systems are negative, except as otherwise noted.      Objective    /60   Pulse 98   Temp 99.4  F (37.4  C)   Ht 1.562 m (5' 1.5\")   Wt 62.6 kg (138 lb)   SpO2 99%   BMI 25.65 kg/m    Body mass index is 25.65 kg/m .  Physical Exam   GENERAL: healthy, alert and no distress-- tired / moving well on exam table   EYES: Eyes grossly normal to inspection, PERRL and conjunctivae and sclerae normal  HENT: ear canals and TM's normal, nose and mouth without ulcers or lesions  NECK: no adenopathy, no asymmetry, masses, or scars and thyroid normal to palpation  RESP: lungs clear to auscultation - no rales, rhonchi or wheezes  CV: regular rate and rhythm, normal S1 S2, no S3 or S4, no murmur, click or rub, no peripheral edema and peripheral pulses strong  ABDOMEN: soft, mild diffuse periumbilical tenderness with some pain in suprapubic /RLQ region. , no hepatosplenomegaly, no masses and bowel sounds normal  MS: no gross musculoskeletal defects noted, no edema  SKIN: no suspicious lesions or rashes  NEURO: Normal strength and tone, mentation intact and speech normal  PSYCH: mentation appears normal, affect normal/bright    Results for orders placed or performed in visit on 02/17/22   CRP inflammation     Status: Abnormal   Result Value Ref Range    CRP Inflammation 38.1 (H) 0.0 - 8.0 mg/L   Comprehensive metabolic panel     Status: Abnormal   Result Value Ref Range    Sodium 136 133 - 144 mmol/L    Potassium 3.6 3.4 - 5.3 mmol/L    Chloride 105 94 - 109 mmol/L    Carbon Dioxide (CO2) 28 20 - 32 mmol/L    Anion Gap 3 3 - 14 mmol/L    Urea Nitrogen 9 7 - 30 mg/dL    Creatinine 0.72 0.52 - 1.04 mg/dL    Calcium 8.4 (L) 8.5 - 10.1 mg/dL    Glucose 94 70 - 99 mg/dL    Alkaline Phosphatase 71 40 - 150 U/L    AST 12 0 - 45 U/L    ALT 17 0 - 50 U/L    Protein Total 6.7 (L) 6.8 - 8.8 g/dL    Albumin 3.7 3.4 - 5.0 g/dL    Bilirubin Total 0.4 0.2 - 1.3 mg/dL    GFR Estimate >90 >60 mL/min/1.73m2   Erythrocyte sedimentation rate " auto     Status: Normal   Result Value Ref Range    Erythrocyte Sedimentation Rate 8 0 - 20 mm/hr   Lipase     Status: Normal   Result Value Ref Range    Lipase 104 73 - 393 U/L   hCG Qualitative Pregnancy     Status: Normal   Result Value Ref Range    hCG Serum Qualitative Negative Negative   CBC with platelets and differential     Status: Abnormal   Result Value Ref Range    WBC Count 6.7 4.0 - 11.0 10e3/uL    RBC Count 4.52 3.80 - 5.20 10e6/uL    Hemoglobin 14.2 11.7 - 15.7 g/dL    Hematocrit 41.4 35.0 - 47.0 %    MCV 92 78 - 100 fL    MCH 31.4 26.5 - 33.0 pg    MCHC 34.3 31.5 - 36.5 g/dL    RDW 12.4 10.0 - 15.0 %    Platelet Count 200 150 - 450 10e3/uL    % Neutrophils 82 %    % Lymphocytes 9 %    % Monocytes 8 %    % Eosinophils 0 %    % Basophils 0 %    % Immature Granulocytes 1 %    NRBCs per 100 WBC 0 <1 /100    Absolute Neutrophils 5.5 1.6 - 8.3 10e3/uL    Absolute Lymphocytes 0.6 (L) 0.8 - 5.3 10e3/uL    Absolute Monocytes 0.5 0.0 - 1.3 10e3/uL    Absolute Eosinophils 0.0 0.0 - 0.7 10e3/uL    Absolute Basophils 0.0 0.0 - 0.2 10e3/uL    Absolute Immature Granulocytes 0.0 <=0.4 10e3/uL    Absolute NRBCs 0.0 10e3/uL   CBC with Platelets & Differential     Status: Abnormal    Narrative    The following orders were created for panel order CBC with Platelets & Differential.  Procedure                               Abnormality         Status                     ---------                               -----------         ------                     CBC with platelets and d...[[id number]]  Abnormal            Final result                 Please view results for these tests on the individual orders.

## 2022-02-17 NOTE — LETTER
My Depression Action Plan  Name: Obie Norris   Date of Birth 1995  Date: 2/17/2022    My doctor: Jesenia Pavon   My clinic: [facility] - [place]  [address] [place] [address]  [phone]          GREEN    ZONE   Good Control    What it looks like:     Things are going generally well. You have normal ups and downs. You may even feel depressed from time to time, but bad moods usually last less than a day.   What you need to do:  1. Continue to care for yourself (see self care plan)  2. Check your depression survival kit and update it as needed  3. Follow your physician s recommendations including any medication.  4. Do not stop taking medication unless you consult with your physician first.           YELLOW         ZONE Getting Worse    What it looks like:     Depression is starting to interfere with your life.     It may be hard to get out of bed; you may be starting to isolate yourself from others.    Symptoms of depression are starting to last most all day and this has happened for several days.     You may have suicidal thoughts but they are not constant.   What you need to do:     1. Call your care team. Your response to treatment will improve if you keep your care team informed of your progress. Yellow periods are signs an adjustment may need to be made.     2. Continue your self-care.  Just get dressed and ready for the day.  Don't give yourself time to talk yourself out of it.    3. Talk to someone in your support network.    4. Open up your Depression Self-Care Plan/Wellness Kit.           RED    ZONE Medical Alert - Get Help    What it looks like:     Depression is seriously interfering with your life.     You may experience these or other symptoms: You can t get out of bed most days, can t work or engage in other necessary activities, you have trouble taking care of basic hygiene, or basic responsibilities, thoughts of suicide or death that will not go  away, self-injurious behavior.     What you need to do:  1. Call your care team and request a same-day appointment. If they are not available (weekends or after hours) call your local crisis line, emergency room or 911.          Depression Self-Care Plan / Wellness Kit    Many people find that medication and therapy are helpful treatments for managing depression. In addition, making small changes to your everyday life can help to boost your mood and improve your wellbeing. Below are some tips for you to consider. Be sure to talk with your medical provider and/or behavioral health consultant if your symptoms are worsening or not improving.     Sleep   Sleep hygiene  means all of the habits that support good, restful sleep. It includes maintaining a consistent bedtime and wake time, using your bedroom only for sleeping or sex, and keeping the bedroom dark and free of distractions like a computer, smartphone, or television.     Develop a Healthy Routine  Maintain good hygiene. Get out of bed in the morning, make your bed, brush your teeth, take a shower, and get dressed. Don t spend too much time viewing media that makes you feel stressed. Find time to relax each day.    Exercise  Get some form of exercise every day. This will help reduce pain and release endorphins, the  feel good  chemicals in your brain. It can be as simple as just going for a walk or doing some gardening, anything that will get you moving.      Diet  Strive to eat healthy foods, including fruits and vegetables. Drink plenty of water. Avoid excessive sugar, caffeine, alcohol, and other mood-altering substances.     Stay Connected with Others  Stay in touch with friends and family members.    Manage Your Mood  Try deep breathing, massage therapy, biofeedback, or meditation. Take part in fun activities when you can. Try to find something to smile about each day.     Psychotherapy  Be open to working with a therapist if your provider recommends it.      Medication  Be sure to take your medication as prescribed. Most anti-depressants need to be taken every day. It usually takes several weeks for medications to work. Not all medicines work for all people. It is important to follow-up with your provider to make sure you have a treatment plan that is working for you. Do not stop your medication abruptly without first discussing it with your provider.    Crisis Resources   These hotlines are for both adults and children. They and are open 24 hours a day, 7 days a week unless noted otherwise.      National Suicide Prevention Lifeline   3-273-748-TALK (5776)      Crisis Text Line    www.crisistextline.org  Text HOME to 310638 from anywhere in the [place], anytime, about any type of crisis. A live, trained crisis counselor will receive the text and respond quickly.      [place] Lifeline for LGBTQ Youth  A national crisis intervention and suicide lifeline for LGBTQ youth under 25. Provides a safe place to talk without judgement. Call 1-623.832.8915; text START to 902240 or visit www.thetrevorproject.org to talk to a trained counselor.      For [facility] crisis numbers, visit the [facility] website at:  https://[place].gov/dhs/people-we-serve/adults/health-care/mental-health/resources/crisis-contacts.jsp

## 2022-02-17 NOTE — NURSING NOTE
"Chief Complaint   Patient presents with     Abdominal Pain       Initial /60   Pulse 98   Temp 99.4  F (37.4  C)   Ht 1.562 m (5' 1.5\")   Wt 62.6 kg (138 lb)   SpO2 99%   BMI 25.65 kg/m   Estimated body mass index is 25.65 kg/m  as calculated from the following:    Height as of this encounter: 1.562 m (5' 1.5\").    Weight as of this encounter: 62.6 kg (138 lb).  Medication Reconciliation: complete  Gopal Barkley LPN  "

## 2022-02-18 ASSESSMENT — ANXIETY QUESTIONNAIRES: GAD7 TOTAL SCORE: 1

## 2022-03-09 DIAGNOSIS — E28.2 PCOS (POLYCYSTIC OVARIAN SYNDROME): ICD-10-CM

## 2022-03-09 RX ORDER — SPIRONOLACTONE 25 MG/1
TABLET ORAL
Qty: 60 TABLET | Refills: 1 | Status: SHIPPED | OUTPATIENT
Start: 2022-03-09 | End: 2022-04-06

## 2022-03-26 ENCOUNTER — HEALTH MAINTENANCE LETTER (OUTPATIENT)
Age: 27
End: 2022-03-26

## 2022-04-04 ENCOUNTER — TELEPHONE (OUTPATIENT)
Dept: FAMILY MEDICINE | Facility: OTHER | Age: 27
End: 2022-04-04
Payer: COMMERCIAL

## 2022-04-04 NOTE — PROGRESS NOTES
SUBJECTIVE:   CC: Obie Norris is an 27 year old woman who presents for preventive health visit.       Patient has been advised of split billing requirements and indicates understanding: Yes  Healthy Habits:     Getting at least 3 servings of Calcium per day:  Yes    Bi-annual eye exam:  NO    Dental care twice a year:  Yes    Sleep apnea or symptoms of sleep apnea:  None    Diet:  Regular (no restrictions)    Frequency of exercise:  4-5 days/week    Duration of exercise:  Greater than 60 minutes    Taking medications regularly:  Yes    Medication side effects:  None    PHQ-2 Total Score: 2    Additional concerns today:  No      PCOS:  Treatment with Daily birth control, metformin and spironolactone   She states she was having a hard time loosing weight   She stopped the metformin and spironolactone and feels like she is doing well  She is planning on getting  this summer after she will stop the birth control medication     IBS:  recently had a work up for abdominal pain probable viral vs food poisoning   IBS improved with decreased dairy and no eggs     Acne   Treatment with clindamycin gel - stopped   Using cedafil and differen as needed   Following with Dermatology Dr Ferguson    Today's PHQ-2 Score:   PHQ-2 ( 1999 Pfizer) 3/30/2022   Q1: Little interest or pleasure in doing things 2   Q2: Feeling down, depressed or hopeless 0   PHQ-2 Score 2   PHQ-2 Total Score (12-17 Years)- Positive if 3 or more points; Administer PHQ-A if positive -   Q1: Little interest or pleasure in doing things More than half the days   Q2: Feeling down, depressed or hopeless Not at all   PHQ-2 Score 2       Abuse: Current or Past (Physical, Sexual or Emotional) - No  Do you feel safe in your environment? Yes        Social History     Tobacco Use     Smoking status: Never Smoker     Smokeless tobacco: Never Used   Substance Use Topics     Alcohol use: Yes     Comment: occasionally     If you drink alcohol do you typically  have >3 drinks per day or >7 drinks per week? No    Alcohol Use 4/6/2022   Prescreen: >3 drinks/day or >7 drinks/week? -   Prescreen: >3 drinks/day or >7 drinks/week? No       Reviewed orders with patient.  Reviewed health maintenance and updated orders accordingly - Yes  Labs reviewed in EPIC  BP Readings from Last 3 Encounters:   04/06/22 92/50   02/17/22 102/60   11/15/21 112/62    Wt Readings from Last 3 Encounters:   04/06/22 63 kg (139 lb)   02/17/22 62.6 kg (138 lb)   11/15/21 63 kg (139 lb)                  Patient Active Problem List   Diagnosis     DUB (dysfunctional uterine bleeding)-always--12/2018     PCOS (polycystic ovarian syndrome)--clinical signs and syptoms--12/2018     Irritable bowel syndrome with loose stools--IBS diet,Dicyclomine--12/2018     Cystic acne     Past Surgical History:   Procedure Laterality Date     ADENOIDECTOMY       COLONOSCOPY N/A 4/9/2021    Procedure: COLONOSCOPY;  Surgeon: Sd Vaca MD;  Location: [facility] OR     [facility] RAD RESEC TONSIL/PILLARS         Social History     Tobacco Use     Smoking status: Never Smoker     Smokeless tobacco: Never Used   Substance Use Topics     Alcohol use: Yes     Comment: occasionally     Family History   Problem Relation Age of Onset     Colorectal Cancer Mother      Throat cancer Father      Heart Disease Maternal Grandfather      Rheumatoid Arthritis Sister      Diabetes Other      Dementia Other          Current Outpatient Medications   Medication Sig Dispense Refill     norethindrone (MICRONOR) 0.35 MG tablet TAKE 1 TABLET BY MOUTH TWICE DAILY (WITH MEALS) 56 tablet 11     sertraline (ZOLOFT) 50 MG tablet Take 50 mg by mouth daily       traZODone (DESYREL) 50 MG tablet TAKE 1/2 TO 1 TABLET BY MOUTH AT BEDTIME AS NEEDED FOR SLEEP       valACYclovir (VALTREX) 500 MG tablet Take 1 tablet (500 mg) by mouth 2 times daily (Patient not taking: Reported on 4/6/2022) 6 tablet 3     Allergies   Allergen Reactions     Sulfa Drugs Hives  "      Breast Cancer Screening:    FHS-7:   Breast CA Risk Assessment (FHS-7) 2/16/2021 3/30/2022   Did any of your first-degree relatives have breast or ovarian cancer? No No   Did any of your relatives have bilateral breast cancer? No No   Did any man in your family have breast cancer? No No   Did any woman in your family have breast and ovarian cancer? No No   Did any woman in your family have breast cancer before age 50 y? No No   Do you have 2 or more relatives with breast and/or ovarian cancer? No No   Do you have 2 or more relatives with breast and/or bowel cancer? No No       Patient under 40 years of age: Routine Mammogram Screening not recommended.   Pertinent mammograms are reviewed under the imaging tab.    History of abnormal Pap smear: NO - age 21-29 PAP every 3 years recommended  PAP / HPV 11/6/2019   PAP (Historical) NIL     Reviewed and updated as needed this visit by clinical staff   Tobacco   Meds              Reviewed and updated as needed this visit by Provider                     Review of Systems  CONSTITUTIONAL: NEGATIVE for fever, chills, change in weight  INTEGUMENTARU/SKIN: NEGATIVE for worrisome rashes, moles or lesions  EYES: NEGATIVE for vision changes or irritation  ENT: NEGATIVE for ear, mouth and throat problems  RESP: NEGATIVE for significant cough or SOB  BREAST: NEGATIVE for masses, tenderness or discharge  CV: NEGATIVE for chest pain, palpitations or peripheral edema  GI: NEGATIVE for nausea, abdominal pain, heartburn, or change in bowel habits  : NEGATIVE for unusual urinary or vaginal symptoms. Periods are regular.  MUSCULOSKELETAL: NEGATIVE for significant arthralgias or myalgia  NEURO: NEGATIVE for weakness, dizziness or paresthesias  ENDOCRINE: NEGATIVE for temperature intolerance, skin/hair changes  PSYCHIATRIC: NEGATIVE for changes in mood or affect     OBJECTIVE:   BP 92/50   Pulse 78   Temp 97.3  F (36.3  C) (Tympanic)   Ht 1.556 m (5' 1.25\")   Wt 63 kg (139 lb)  " " SpO2 99%   BMI 26.05 kg/m    Physical Exam  GENERAL: healthy, alert and no distress  EYES: Eyes grossly normal to inspection, PERRL and conjunctivae and sclerae normal  HENT: ear canals and TM's normal, nose and mouth without ulcers or lesions  NECK: no adenopathy, no asymmetry, masses, or scars and thyroid normal to palpation  RESP: lungs clear to auscultation - no rales, rhonchi or wheezes  CV: regular rate and rhythm, normal S1 S2, no S3 or S4, no murmur, click or rub, no peripheral edema and peripheral pulses strong  ABDOMEN: soft, nontender, no hepatosplenomegaly, no masses and bowel sounds normal  MS: no gross musculoskeletal defects noted, no edema  SKIN: no suspicious lesions or rashes  NEURO: Normal strength and tone, sensory exam grossly normal, mentation intact, speech normal and cranial nerves 2-12 intact  PSYCH: mentation appears normal, affect normal/bright  LYMPH: normal ant/post cervical, supraclavicular nodes    Diagnostic Test Results:  Labs reviewed in Epic    ASSESSMENT/PLAN:   (Z00.00) Routine history and physical examination of adult  (primary encounter diagnosis)  Comment: continue yearly physicals      (Z11.51) Special screening examination for human papillomavirus (HPV)  Comment: due for screening this year   Plan: A pap thin layer screen reflex to HPV if ASCUS         - recommend age 25 - 29            COUNSELING:  Reviewed preventive health counseling, as reflected in patient instructions       Regular exercise       Healthy diet/nutrition    Estimated body mass index is 26.05 kg/m  as calculated from the following:    Height as of this encounter: 1.556 m (5' 1.25\").    Weight as of this encounter: 63 kg (139 lb).        She reports that she has never smoked. She has never used smokeless tobacco.      Counseling Resources:  ATP IV Guidelines  Pooled Cohorts Equation Calculator  Breast Cancer Risk Calculator  BRCA-Related Cancer Risk Assessment: FHS-7 Tool  FRAX Risk Assessment  ICSI " Preventive Guidelines  Dietary Guidelines for Americans, 2010  USDA's MyPlate  ASA Prophylaxis  Lung CA Screening    EDWARD Byers CNP  [facility]

## 2022-04-06 ENCOUNTER — OFFICE VISIT (OUTPATIENT)
Dept: FAMILY MEDICINE | Facility: OTHER | Age: 27
End: 2022-04-06
Attending: NURSE PRACTITIONER
Payer: COMMERCIAL

## 2022-04-06 VITALS
HEART RATE: 78 BPM | OXYGEN SATURATION: 99 % | TEMPERATURE: 97.3 F | SYSTOLIC BLOOD PRESSURE: 92 MMHG | WEIGHT: 139 LBS | DIASTOLIC BLOOD PRESSURE: 50 MMHG | HEIGHT: 61 IN | BODY MASS INDEX: 26.24 KG/M2

## 2022-04-06 DIAGNOSIS — Z11.51 SPECIAL SCREENING EXAMINATION FOR HUMAN PAPILLOMAVIRUS (HPV): ICD-10-CM

## 2022-04-06 DIAGNOSIS — Z00.00 ROUTINE HISTORY AND PHYSICAL EXAMINATION OF ADULT: Primary | ICD-10-CM

## 2022-04-06 PROCEDURE — 99395 PREV VISIT EST AGE 18-39: CPT | Performed by: NURSE PRACTITIONER

## 2022-04-06 PROCEDURE — G0145 SCR C/V CYTO,THINLAYER,RESCR: HCPCS | Performed by: NURSE PRACTITIONER

## 2022-04-06 ASSESSMENT — PAIN SCALES - GENERAL: PAINLEVEL: NO PAIN (0)

## 2022-04-06 NOTE — NURSING NOTE
"Chief Complaint   Patient presents with     Physical       Initial BP 92/50   Pulse 78   Temp 97.3  F (36.3  C) (Tympanic)   Ht 1.556 m (5' 1.25\")   Wt 63 kg (139 lb)   SpO2 99%   BMI 26.05 kg/m   Estimated body mass index is 26.05 kg/m  as calculated from the following:    Height as of this encounter: 1.556 m (5' 1.25\").    Weight as of this encounter: 63 kg (139 lb).  Medication Reconciliation: complete  Clay Hanson LPN  "

## 2022-04-08 LAB
BKR LAB AP GYN ADEQUACY: NORMAL
BKR LAB AP GYN INTERPRETATION: NORMAL
BKR LAB AP HPV REFLEX: NORMAL
BKR LAB AP PREVIOUS ABNORMAL: NORMAL
PATH REPORT.COMMENTS IMP SPEC: NORMAL
PATH REPORT.COMMENTS IMP SPEC: NORMAL
PATH REPORT.RELEVANT HX SPEC: NORMAL

## 2022-05-17 ENCOUNTER — OFFICE VISIT (OUTPATIENT)
Dept: FAMILY MEDICINE | Facility: OTHER | Age: 27
End: 2022-05-17
Attending: NURSE PRACTITIONER
Payer: COMMERCIAL

## 2022-05-17 VITALS
HEART RATE: 64 BPM | OXYGEN SATURATION: 99 % | WEIGHT: 136 LBS | TEMPERATURE: 97.7 F | DIASTOLIC BLOOD PRESSURE: 64 MMHG | BODY MASS INDEX: 25.49 KG/M2 | SYSTOLIC BLOOD PRESSURE: 102 MMHG

## 2022-05-17 DIAGNOSIS — L73.9 FOLLICULITIS: Primary | ICD-10-CM

## 2022-05-17 PROCEDURE — 99213 OFFICE O/P EST LOW 20 MIN: CPT | Performed by: NURSE PRACTITIONER

## 2022-05-17 RX ORDER — DOXYCYCLINE 100 MG/1
100 CAPSULE ORAL 2 TIMES DAILY
Qty: 14 CAPSULE | Refills: 0 | Status: SHIPPED | OUTPATIENT
Start: 2022-05-17 | End: 2022-05-24

## 2022-05-17 ASSESSMENT — PAIN SCALES - GENERAL: PAINLEVEL: NO PAIN (0)

## 2022-05-17 NOTE — NURSING NOTE
"Chief Complaint   Patient presents with     Rash       Initial /64 (BP Location: Right arm, Patient Position: Chair, Cuff Size: Adult Regular)   Pulse 64   Temp 97.7  F (36.5  C) (Tympanic)   Wt 61.7 kg (136 lb)   SpO2 99%   BMI 25.49 kg/m   Estimated body mass index is 25.49 kg/m  as calculated from the following:    Height as of 4/6/22: 1.556 m (5' 1.25\").    Weight as of this encounter: 61.7 kg (136 lb).  Medication Reconciliation: complete  Sofia Kaur LPN    "

## 2022-05-17 NOTE — PROGRESS NOTES
"  Assessment & Plan     Folliculitis  Starting to develop rash/inflamation to bilateral inner thigh both legs.  Previous treatment with Doxy - will prescribe again   Follow up if no improvement or worsening symptoms   - doxycycline hyclate (VIBRAMYCIN) 100 MG capsule; Take 1 capsule (100 mg) by mouth 2 times daily for 7 days       BMI:   Estimated body mass index is 25.49 kg/m  as calculated from the following:    Height as of 4/6/22: 1.556 m (5' 1.25\").    Weight as of this encounter: 61.7 kg (136 lb).       See Patient Instructions    No follow-ups on file.    EDWARD Byers [facility] - [place] is a 27 year old who presents for the following health issues     HPI     Rash  Onset/Duration: 2 days   Description  Location: thighs  Character: round, red  Itching: no  Intensity:  mild  Progression of Symptoms:  same  Accompanying signs and symptoms:   Fever: no  Body aches or joint pain: no  Sore throat symptoms: no  Recent cold symptoms: no  History:           Previous episodes of similar rash: yes  New exposures:  None  Recent travel: no  Exposure to similar rash: no  Precipitating or alleviating factors: none  Therapies tried and outcome: none  History of folliculitis with treatment of Doxy in the past.  Last flair up about 4 years ago.  Rash is in both inner thighs         Review of Systems   CONSTITUTIONAL: NEGATIVE for fever, chills, change in weight  INTEGUMENTARY/SKIN: rash inner thighs   RESP: NEGATIVE for significant cough or SOB  CV: NEGATIVE for chest pain, palpitations or peripheral edema      Objective    /64 (BP Location: Right arm, Patient Position: Chair, Cuff Size: Adult Regular)   Pulse 64   Temp 97.7  F (36.5  C) (Tympanic)   Wt 61.7 kg (136 lb)   SpO2 99%   BMI 25.49 kg/m    Body mass index is 25.49 kg/m .  Physical Exam   GENERAL: healthy, alert and no distress  RESP: lungs clear to auscultation - no rales, rhonchi or wheezes  CV: " regular rate and rhythm, normal S1 S2, no S3 or S4, no murmur, click or rub, no peripheral edema and peripheral pulses strong  SKIN: red swelling area with white pustulars in the center   PSYCH: mentation appears normal, affect normal/bright

## 2022-05-31 DIAGNOSIS — L08.9 FACIAL INFECTION: Primary | ICD-10-CM

## 2022-05-31 RX ORDER — AMOXICILLIN AND CLAVULANATE POTASSIUM 500; 125 MG/1; MG/1
1 TABLET, FILM COATED ORAL 2 TIMES DAILY
Qty: 10 TABLET | Refills: 0 | Status: SHIPPED | OUTPATIENT
Start: 2022-05-31 | End: 2023-02-27

## 2022-06-29 DIAGNOSIS — N97.9 FEMALE INFERTILITY: Primary | ICD-10-CM

## 2022-09-18 ENCOUNTER — HEALTH MAINTENANCE LETTER (OUTPATIENT)
Age: 27
End: 2022-09-18

## 2022-09-26 DIAGNOSIS — F33.41 MAJOR DEPRESSIVE DISORDER, RECURRENT EPISODE, IN PARTIAL REMISSION (H): ICD-10-CM

## 2022-09-26 DIAGNOSIS — F41.1 GAD (GENERALIZED ANXIETY DISORDER): Primary | ICD-10-CM

## 2022-10-11 ENCOUNTER — ALLIED HEALTH/NURSE VISIT (OUTPATIENT)
Dept: FAMILY MEDICINE | Facility: OTHER | Age: 27
End: 2022-10-11
Attending: NURSE PRACTITIONER

## 2022-10-11 DIAGNOSIS — J02.9 ACUTE PHARYNGITIS, UNSPECIFIED ETIOLOGY: Primary | ICD-10-CM

## 2022-10-11 LAB — GROUP A STREP BY PCR: NOT DETECTED

## 2022-10-11 PROCEDURE — 87651 STREP A DNA AMP PROBE: CPT | Performed by: FAMILY MEDICINE

## 2022-10-12 ENCOUNTER — LAB REQUISITION (OUTPATIENT)
Dept: LAB | Facility: HOSPITAL | Age: 27
End: 2022-10-12

## 2022-10-12 ENCOUNTER — LAB (OUTPATIENT)
Dept: OCCUPATIONAL MEDICINE | Facility: OTHER | Age: 27
End: 2022-10-12

## 2022-10-12 LAB — SARS-COV-2 RNA RESP QL NAA+PROBE: NEGATIVE

## 2022-10-12 PROCEDURE — U0005 INFEC AGEN DETEC AMPLI PROBE: HCPCS | Performed by: FAMILY MEDICINE

## 2022-12-06 ENCOUNTER — OFFICE VISIT (OUTPATIENT)
Dept: FAMILY MEDICINE | Facility: OTHER | Age: 27
End: 2022-12-06
Attending: FAMILY MEDICINE
Payer: COMMERCIAL

## 2022-12-06 VITALS
DIASTOLIC BLOOD PRESSURE: 70 MMHG | TEMPERATURE: 100.7 F | BODY MASS INDEX: 25.49 KG/M2 | SYSTOLIC BLOOD PRESSURE: 110 MMHG | WEIGHT: 136 LBS | HEART RATE: 120 BPM

## 2022-12-06 DIAGNOSIS — J10.1 INFLUENZA A: ICD-10-CM

## 2022-12-06 DIAGNOSIS — B34.9 VIRAL SYNDROME: Primary | ICD-10-CM

## 2022-12-06 PROCEDURE — 99213 OFFICE O/P EST LOW 20 MIN: CPT | Performed by: FAMILY MEDICINE

## 2022-12-06 RX ORDER — OSELTAMIVIR PHOSPHATE 75 MG/1
75 CAPSULE ORAL 2 TIMES DAILY
Qty: 10 CAPSULE | Refills: 0 | Status: SHIPPED | OUTPATIENT
Start: 2022-12-06 | End: 2023-02-27

## 2022-12-06 RX ORDER — OSELTAMIVIR PHOSPHATE 75 MG/1
75 CAPSULE ORAL 2 TIMES DAILY
Qty: 10 CAPSULE | Refills: 0 | Status: SHIPPED | OUTPATIENT
Start: 2022-12-06 | End: 2022-12-06

## 2022-12-06 NOTE — PROGRESS NOTES
"  Assessment & Plan     Viral syndrome  Probably RSV/Flu or Covid. Swab done. 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 Symptomatic treatment was discussed along when patient should call and/or come back into the clinic or go to ER/Urgent care. All questions answered.   Symptomatic treatment was discussed along what is available for OTC medications for symptomatic relief.                  BMI:   Estimated body mass index is 25.49 kg/m  as calculated from the following:    Height as of 4/6/22: 1.556 m (5' 1.25\").    Weight as of this encounter: 61.7 kg (136 lb).           No follow-ups on file.    Dom Maradiaga MD  [facility] - [place] is a 27 year old, presenting for the following health issues:  No chief complaint on file.      HPI     Acute Illness  Acute illness concerns: fever and body aches   Onset/Duration: 2 days  Symptoms:  Fever: YES  Chills/Sweats: YES  Headache (location?): YES  Sinus Pressure: YES  Conjunctivitis:  No  Ear Pain: no  Rhinorrhea: YES  Congestion: YES  Sore Throat: YES  Cough: YES  Wheeze: No  Decreased Appetite: No  Nausea: No  Vomiting: No  Diarrhea: No  Dysuria/Freq.: No  Dysuria or Hematuria: No  Fatigue/Achiness: YES  Sick/Strep Exposure: No  Therapies tried and outcome: None      Review of Systems   Constitutional, HEENT, cardiovascular, pulmonary, gi and gu systems are negative, except as otherwise noted.      Objective    /70   Pulse 120   Temp (!) 100.7  F (38.2  C) (Tympanic)   Wt 61.7 kg (136 lb)   BMI 25.49 kg/m    Body mass index is 25.49 kg/m .  Physical Exam   GENERAL: healthy, alert and mild diistress-- ill not toxic   EYES: Eyes grossly normal to inspection, PERRL and conjunctivae and sclerae normal  HENT: ear canals and TM's normal, nose and mouth without ulcers or lesions  NECK: no adenopathy, no asymmetry, masses, or scars and thyroid normal to palpation  RESP: lungs clear to auscultation - no rales, rhonchi or wheezes  CV: TACHY " regular rate and rhythm, normal S1 S2, no S3 or S4, no murmur, click or rub, no peripheral edema and peripheral pulses strong  ABDOMEN: soft, nontender, no hepatosplenomegaly, no masses and bowel sounds normal

## 2022-12-23 ENCOUNTER — APPOINTMENT (OUTPATIENT)
Dept: OCCUPATIONAL MEDICINE | Facility: OTHER | Age: 27
End: 2022-12-23
Payer: COMMERCIAL

## 2022-12-23 PROCEDURE — 86580 TB INTRADERMAL TEST: CPT

## 2022-12-31 DIAGNOSIS — B00.9 HSV (HERPES SIMPLEX VIRUS) INFECTION: ICD-10-CM

## 2022-12-31 RX ORDER — VALACYCLOVIR HYDROCHLORIDE 500 MG/1
500 TABLET, FILM COATED ORAL 2 TIMES DAILY
Qty: 6 TABLET | Refills: 3 | Status: SHIPPED | OUTPATIENT
Start: 2022-12-31

## 2023-01-06 ENCOUNTER — ALLIED HEALTH/NURSE VISIT (OUTPATIENT)
Dept: OCCUPATIONAL MEDICINE | Facility: OTHER | Age: 28
End: 2023-01-06

## 2023-01-06 DIAGNOSIS — E28.2 PCOS (POLYCYSTIC OVARIAN SYNDROME): Primary | ICD-10-CM

## 2023-01-06 PROCEDURE — 86580 TB INTRADERMAL TEST: CPT

## 2023-02-07 ASSESSMENT — ANXIETY QUESTIONNAIRES
4. TROUBLE RELAXING: SEVERAL DAYS
GAD7 TOTAL SCORE: 4
2. NOT BEING ABLE TO STOP OR CONTROL WORRYING: SEVERAL DAYS
5. BEING SO RESTLESS THAT IT IS HARD TO SIT STILL: NOT AT ALL
1. FEELING NERVOUS, ANXIOUS, OR ON EDGE: MORE THAN HALF THE DAYS
7. FEELING AFRAID AS IF SOMETHING AWFUL MIGHT HAPPEN: NOT AT ALL
IF YOU CHECKED OFF ANY PROBLEMS ON THIS QUESTIONNAIRE, HOW DIFFICULT HAVE THESE PROBLEMS MADE IT FOR YOU TO DO YOUR WORK, TAKE CARE OF THINGS AT HOME, OR GET ALONG WITH OTHER PEOPLE: SOMEWHAT DIFFICULT
GAD7 TOTAL SCORE: 4
3. WORRYING TOO MUCH ABOUT DIFFERENT THINGS: NOT AT ALL
6. BECOMING EASILY ANNOYED OR IRRITABLE: NOT AT ALL

## 2023-02-07 ASSESSMENT — PATIENT HEALTH QUESTIONNAIRE - PHQ9: SUM OF ALL RESPONSES TO PHQ QUESTIONS 1-9: 10

## 2023-02-27 ENCOUNTER — OFFICE VISIT (OUTPATIENT)
Dept: FAMILY MEDICINE | Facility: OTHER | Age: 28
End: 2023-02-27
Attending: NURSE PRACTITIONER

## 2023-02-27 VITALS
BODY MASS INDEX: 27.38 KG/M2 | SYSTOLIC BLOOD PRESSURE: 118 MMHG | HEIGHT: 61 IN | WEIGHT: 145 LBS | TEMPERATURE: 97.7 F | DIASTOLIC BLOOD PRESSURE: 70 MMHG | OXYGEN SATURATION: 99 % | HEART RATE: 63 BPM

## 2023-02-27 DIAGNOSIS — N89.8 VAGINAL DISCHARGE: Primary | ICD-10-CM

## 2023-02-27 LAB
CLUE CELLS: ABNORMAL
TRICHOMONAS, WET PREP: ABNORMAL
WBC'S/HIGH POWER FIELD, WET PREP: ABNORMAL
YEAST, WET PREP: ABNORMAL

## 2023-02-27 PROCEDURE — 99213 OFFICE O/P EST LOW 20 MIN: CPT | Performed by: NURSE PRACTITIONER

## 2023-02-27 PROCEDURE — 87210 SMEAR WET MOUNT SALINE/INK: CPT | Performed by: NURSE PRACTITIONER

## 2023-02-27 RX ORDER — PNV NO.95/FERROUS FUM/FOLIC AC 28MG-0.8MG
TABLET ORAL
COMMUNITY

## 2023-02-27 RX ORDER — CHORIOGONADOTROPIN ALFA 250 UG/.5ML
INJECTION, SOLUTION SUBCUTANEOUS
COMMUNITY
Start: 2022-12-09 | End: 2023-10-16

## 2023-02-27 RX ORDER — ESTRADIOL 2 MG/1
TABLET ORAL
COMMUNITY
Start: 2022-12-21 | End: 2023-10-16

## 2023-02-27 RX ORDER — MEDROXYPROGESTERONE ACETATE 10 MG
TABLET ORAL
COMMUNITY
Start: 2022-09-23 | End: 2023-10-16

## 2023-02-27 RX ORDER — LETROZOLE 2.5 MG/1
TABLET, FILM COATED ORAL
COMMUNITY
Start: 2022-12-09 | End: 2023-10-16

## 2023-02-27 ASSESSMENT — PAIN SCALES - GENERAL: PAINLEVEL: NO PAIN (0)

## 2023-02-27 NOTE — PROGRESS NOTES
"  Assessment & Plan     Vaginal discharge  Wet prep normal. I therefore do not feel she has a yeast or bacterial infection. She is not currently pregnant. Can try boric acid to see if this helps. Will return with new or worsening symptoms.     BMI:   Estimated body mass index is 27.17 kg/m  as calculated from the following:    Height as of this encounter: 1.556 m (5' 1.25\").    Weight as of this encounter: 65.8 kg (145 lb).       Mattie Meadows NP  [facility] - [place] is a 28 year old, presenting for the following health issues:  Urinary Problem      HPI     Vaginal Symptoms  Onset/Duration: 2 weeks  Description:  Vaginal Discharge: white or green   Itching (Pruritis): No  Burning sensation:  No  Odor: YES  Accompanying Signs & Symptoms:  Urinary symptoms: No  Abdominal pain: No  Fever: No  History:   Sexually active: YES, no concern about STDs  New Partner: No  Possibility of Pregnancy:  No  Recent antibiotic use: No  Previous vaginitis issues: YES  Precipitating or alleviating factors: None  Therapies tried and outcome: none      Review of Systems   Constitutional, HEENT, cardiovascular, pulmonary, gi and gu systems are negative, except as otherwise noted.      Objective    /70 (BP Location: Left arm, Patient Position: Sitting, Cuff Size: Adult Regular)   Pulse 63   Temp 97.7  F (36.5  C) (Tympanic)   Ht 1.556 m (5' 1.25\")   Wt 65.8 kg (145 lb)   SpO2 99%   BMI 27.17 kg/m    Body mass index is 27.17 kg/m .  Physical Exam   GENERAL: healthy, alert and no distress  RESP: lungs clear to auscultation - no rales, rhonchi or wheezes  CV: regular rate and rhythm, no murmur, click or rub, no peripheral edema  ABDOMEN: soft, nontender, no hepatosplenomegaly, no masses and bowel sounds normal   (female): normal female external genitalia, normal urethral meatus, vaginal mucosa with a small amount of thick white discahrge  NEURO: Normal strength and tone, mentation intact " and speech normal  PSYCH: mentation appears normal, affect normal/bright    Results for orders placed or performed in visit on 02/27/23 (from the past 24 hour(s))   Wet prep    Specimen: Vagina; Swab   Result Value Ref Range    Trichomonas Absent Absent    Yeast Absent Absent    Clue Cells Absent Absent    WBCs/high power field 1+ (A) None

## 2023-05-07 ENCOUNTER — HEALTH MAINTENANCE LETTER (OUTPATIENT)
Age: 28
End: 2023-05-07

## 2023-06-26 DIAGNOSIS — L02.92 FURUNCLE OF SKIN OR SUBCUTANEOUS TISSUE: Primary | ICD-10-CM

## 2023-06-26 RX ORDER — DOXYCYCLINE 100 MG/1
100 CAPSULE ORAL 2 TIMES DAILY
Qty: 20 CAPSULE | Refills: 0 | Status: SHIPPED | OUTPATIENT
Start: 2023-06-26 | End: 2023-10-04

## 2023-08-21 DIAGNOSIS — L02.92 FURUNCLE OF SKIN OR SUBCUTANEOUS TISSUE: Primary | ICD-10-CM

## 2023-08-21 RX ORDER — CLINDAMYCIN PHOSPHATE 11.9 MG/ML
SOLUTION TOPICAL 2 TIMES DAILY
Qty: 60 ML | Refills: 1 | Status: SHIPPED | OUTPATIENT
Start: 2023-08-21

## 2023-10-04 ENCOUNTER — MYC MEDICAL ADVICE (OUTPATIENT)
Dept: FAMILY MEDICINE | Facility: OTHER | Age: 28
End: 2023-10-04

## 2023-10-04 ENCOUNTER — OFFICE VISIT (OUTPATIENT)
Dept: FAMILY MEDICINE | Facility: OTHER | Age: 28
End: 2023-10-04
Attending: FAMILY MEDICINE
Payer: COMMERCIAL

## 2023-10-04 VITALS
WEIGHT: 148.6 LBS | OXYGEN SATURATION: 99 % | TEMPERATURE: 97.3 F | HEART RATE: 83 BPM | HEIGHT: 61 IN | BODY MASS INDEX: 28.05 KG/M2 | DIASTOLIC BLOOD PRESSURE: 68 MMHG | SYSTOLIC BLOOD PRESSURE: 110 MMHG | RESPIRATION RATE: 17 BRPM

## 2023-10-04 DIAGNOSIS — N83.202 CYST OF LEFT OVARY: ICD-10-CM

## 2023-10-04 DIAGNOSIS — R53.83 OTHER FATIGUE: ICD-10-CM

## 2023-10-04 DIAGNOSIS — N91.2 AMENORRHEA: Primary | ICD-10-CM

## 2023-10-04 DIAGNOSIS — Z83.2 FAMILY HISTORY OF AUTOIMMUNE DISORDER: ICD-10-CM

## 2023-10-04 DIAGNOSIS — N94.6 DYSMENORRHEA: ICD-10-CM

## 2023-10-04 DIAGNOSIS — N92.6 IRREGULAR MENSTRUAL CYCLE: ICD-10-CM

## 2023-10-04 DIAGNOSIS — N94.3 PREMENSTRUAL SYNDROME: ICD-10-CM

## 2023-10-04 DIAGNOSIS — E55.9 HYPOVITAMINOSIS D: ICD-10-CM

## 2023-10-04 PROCEDURE — 99215 OFFICE O/P EST HI 40 MIN: CPT | Performed by: FAMILY MEDICINE

## 2023-10-04 RX ORDER — UBIDECARENONE 200 MG
CAPSULE ORAL
COMMUNITY

## 2023-10-04 RX ORDER — AMOXICILLIN 500 MG
1200 CAPSULE ORAL
COMMUNITY

## 2023-10-04 ASSESSMENT — ANXIETY QUESTIONNAIRES
3. WORRYING TOO MUCH ABOUT DIFFERENT THINGS: NOT AT ALL
7. FEELING AFRAID AS IF SOMETHING AWFUL MIGHT HAPPEN: NOT AT ALL
4. TROUBLE RELAXING: NOT AT ALL
6. BECOMING EASILY ANNOYED OR IRRITABLE: NOT AT ALL
IF YOU CHECKED OFF ANY PROBLEMS ON THIS QUESTIONNAIRE, HOW DIFFICULT HAVE THESE PROBLEMS MADE IT FOR YOU TO DO YOUR WORK, TAKE CARE OF THINGS AT HOME, OR GET ALONG WITH OTHER PEOPLE: NOT DIFFICULT AT ALL
1. FEELING NERVOUS, ANXIOUS, OR ON EDGE: NOT AT ALL
GAD7 TOTAL SCORE: 1
5. BEING SO RESTLESS THAT IT IS HARD TO SIT STILL: NOT AT ALL
2. NOT BEING ABLE TO STOP OR CONTROL WORRYING: SEVERAL DAYS
GAD7 TOTAL SCORE: 1

## 2023-10-04 ASSESSMENT — PATIENT HEALTH QUESTIONNAIRE - PHQ9
SUM OF ALL RESPONSES TO PHQ QUESTIONS 1-9: 5
10. IF YOU CHECKED OFF ANY PROBLEMS, HOW DIFFICULT HAVE THESE PROBLEMS MADE IT FOR YOU TO DO YOUR WORK, TAKE CARE OF THINGS AT HOME, OR GET ALONG WITH OTHER PEOPLE: SOMEWHAT DIFFICULT
SUM OF ALL RESPONSES TO PHQ QUESTIONS 1-9: 5

## 2023-10-04 ASSESSMENT — PAIN SCALES - GENERAL: PAINLEVEL: NO PAIN (0)

## 2023-10-04 NOTE — PROGRESS NOTES
"  Assessment & Plan     Amenorrhea  Secondary, has had 6 cycles of letrozole and 2 with IUI  Labs tomorrow on day 2/3 and follow-up 2 wks for results  - Luteinizing Hormone; Future  - Follicle stimulating hormone; Future  - Testosterone Free and Total; Future  - DHEA sulfate; Future  - Androstenedione; Future  - Prolactin; Future  - TSH with free T4 reflex; Future  - 17 OH progesterone; Future  - Mullerian Hormone Antibody; Future  - Glucose; Standing  - Insulin level; Standing  - Non-gestational glucose tolerance testing, 2 hour; Future    Premenstrual syndrome  Labs pending  - Luteinizing Hormone; Future  - Follicle stimulating hormone; Future  - Testosterone Free and Total; Future  - DHEA sulfate; Future  - Androstenedione; Future  - Prolactin; Future  - TSH with free T4 reflex; Future  - 17 OH progesterone; Future  - Mullerian Hormone Antibody; Future  - Glucose; Standing  - Insulin level; Standing  - Non-gestational glucose tolerance testing, 2 hour; Future    Dysmenorrhea  Concerning symptoms for endo, await labs    Irregular menstrual cycle  Need full lab work up for PCO, highly suspect  - Comprehensive metabolic panel; Future    Family history of autoimmune disorder  May be candidate for LDN    Hypovitaminosis D  Labs pending  - Vitamin D Deficiency; Future    Other fatigue  Labs pending   Follow-up 2 wks  - Vitamin B12; Future  - CBC with platelets; Future  - Iron and iron binding capacity; Future  - Ferritin; Future    Ovarian cyst in the past -- likely imbalance of E2/P      58 minutes spent by me on the date of the encounter doing chart review, history and exam, documentation and further activities per the note       BMI:   Estimated body mass index is 27.85 kg/m  as calculated from the following:    Height as of this encounter: 1.556 m (5' 1.25\").    Weight as of this encounter: 67.4 kg (148 lb 9.6 oz).   Weight management plan noted, stable and monitoring    Patient was agreeable to this plan and had " no further questions.  See Patient Instructions  No follow-ups on file.    Ibeth Reynolds MD  [facility] [place]    Subjective   Obie is a 28 year old, presenting for the following health issues:  Infertililty        10/4/2023     4:32 PM   Additional Questions   Roomed by MIKEL Euceda   Accompanied by self`       HPI   Clinic Visit  Date of visit: 10/4/2023   Chief Complaint: Infertility consult     HPI:   Obie Webster is a 28 year old female is a 28 year old  female who presents with PCOS .     Prior pregnancy history is as follows: n/a  OB History   No obstetric history on file.       Gynecologic History:  Menstrual History: Patient's last menstrual period was 10/03/2023 (exact date). Menses are irregular, lasting 4 days. Menarche at age 15.    Dysmenorrhea moderate, occurring throughout menses.   Cyclic symptoms include breast tenderness, bloating, irritability, moodiness, depression, anxiety, pelvic pain/cramping, and salt/ sweet food cravings  Additional symptoms include vaginal discharge  History of eating disorder: No  Ovulation predictor kits: not done  Pelvic/abdominal pain: No yes sometimes    STI:Chlamydia  History of PID: No   Fibroids: No  Uterine anomalies:  No  Contraceptive history: mini pill / progesterone only pill. Method used for 6 years  Deep dyspareunia: No  Hirsuitism: Yes  Galactorrhea: No  Last Pap:   Lab Results   Component Value Date    PAP NIL 2019      History of abnormal pap: No.      History of thyroid problems or symptoms (ie. Intolerance to heat/cold, changes in hair growth, body weight:  No  History of chemo/radiation:  No    The patient has been trying to conceive for  1 years  Frequency of intercourse: 2 times/week  Use of lubricants: No    Male factor:   Partner is 33 years old.   History of trauma to the genitalia: No   Medications, tobacco, or drug use: Yes smoking  Prior children: 1 miscarriage    Seen by urologist: No but semen analysis  wnl per patient  Semen analysis: Yes 2023     PAST INFERTILITY EVALUATION AND TREATMENT:  Patient's evaluation has included:  Ultrasound follicle tracking studies and previous full pelvic ul/s with ovarian cysts seen  HSG: yes on 1/2022, findings were patent tubes bilaterally  Laparoscopy: no    Hormonal evaluation has included:   TSH   Date Value Ref Range Status   07/09/2021 1.42 0.40 - 4.00 mU/L Final       Semen Analysis on partner was normal   Allergies: Sulfa antibiotics                    Past Medical History:   Diagnosis Date    Cystic acne 2/12/2021       Past Surgical History:   Procedure Laterality Date    ADENOIDECTOMY      COLONOSCOPY N/A 4/9/2021    Procedure: COLONOSCOPY;  Surgeon: Sd Vaca MD;  Location: [facility] OR    [facility] RAD RESEC TONSIL/PILLARS             Social History     Tobacco Use    Smoking status: Never     Passive exposure: Never    Smokeless tobacco: Never   Substance Use Topics    Alcohol use: Yes     Comment: occasionally              Family History   Problem Relation Age of Onset    Colorectal Cancer Mother     Throat cancer Father     Rheumatoid Arthritis Sister     Heart Disease Maternal Grandfather     Diabetes Other     Dementia Other        Current Outpatient Medications   Medication Sig Dispense Refill    clindamycin (CLEOCIN T) 1 % external solution Apply topically 2 times daily 60 mL 1    Prenatal Vit-Fe Fumarate-FA (PRENATAL VITAMIN) 27-0.8 MG TABS       sertraline (ZOLOFT) 50 MG tablet Take 1 tablet (50 mg) by mouth daily 90 tablet 3    traZODone (DESYREL) 50 MG tablet TAKE 1/2 TO 1 TABLET BY MOUTH AT BEDTIME AS NEEDED FOR SLEEP      valACYclovir (VALTREX) 500 MG tablet Take 1 tablet (500 mg) by mouth 2 times daily 6 tablet 3    coenzyme Q-10 200 MG CAPS capsule Take  by mouth. 1 daily      estradiol (ESTRACE) 2 MG tablet  (Patient not taking: Reported on 10/4/2023)      letrozole (FEMARA) 2.5 MG tablet  (Patient not taking: Reported on 10/4/2023)       "medroxyPROGESTERone (PROVERA) 10 MG tablet  (Patient not taking: Reported on 10/4/2023)      Omega-3 Fatty Acids (FISH OIL) 1200 MG capsule Take 1,200 mg by mouth      OVIDREL 250 MCG/0.5ML syringe SC INJ ADMINISTER 0.5 ML UNDER THE SKIN 1 TIME FOR 1 DOSE (Patient not taking: Reported on 10/4/2023)         Estimated body mass index is 27.85 kg/m  as calculated from the following:    Height as of this encounter: 1.556 m (5' 1.25\").    Weight as of this encounter: 67.4 kg (148 lb 9.6 oz).    GENERAL: No change in weight, sleep or appetite.  Normal energy.  No fever or chills  EYES: Negative for vision changes or eye problems  ENT: No problems with ears, nose or throat.  No difficulty swallowing.  RESP: No coughing, wheezing or shortness of breath  CV: No chest pains or palpitations  GI: No nausea, vomiting,  heartburn, abdominal pain, diarrhea, constipation or change in bowel habits  : No urinary frequency or dysuria, bladder or kidney problems  MUSCULOSKELETAL: No significant muscle or joint pains  NEUROLOGIC: No headaches, numbness, tingling, weakness, problems with balance or coordination  PSYCHIATRIC: No problems with anxiety, depression or mental health  HEME/IMMUNE/ALLERGY: No history of bleeding or clotting problems or anemia.  No allergies or immune system problems  ENDOCRINE: No history of thyroid disease, diabetes or other endocrine disorders  SKIN: No rashes,worrisome lesions or skin problems    Exam:  /68 (BP Location: Left arm, Patient Position: Sitting, Cuff Size: Adult Regular)   Pulse 83   Temp 97.3  F (36.3  C) (Tympanic)   Resp 17   Ht 1.556 m (5' 1.25\")   Wt 67.4 kg (148 lb 9.6 oz)   LMP 10/03/2023 (Exact Date)   SpO2 99%   BMI 27.85 kg/m    GENERAL: healthy, alert and no distress  MS: no gross musculoskeletal defects noted, no edema  PSYCH: mentation appears normal, affect normal/bright      Ibeth Reynolds MD     "

## 2023-10-05 ENCOUNTER — LAB (OUTPATIENT)
Dept: LAB | Facility: OTHER | Age: 28
End: 2023-10-05
Payer: COMMERCIAL

## 2023-10-05 DIAGNOSIS — N94.3 PREMENSTRUAL SYNDROME: ICD-10-CM

## 2023-10-05 DIAGNOSIS — E55.9 HYPOVITAMINOSIS D: ICD-10-CM

## 2023-10-05 DIAGNOSIS — N92.6 IRREGULAR MENSTRUAL CYCLE: ICD-10-CM

## 2023-10-05 DIAGNOSIS — N91.2 AMENORRHEA: ICD-10-CM

## 2023-10-05 DIAGNOSIS — R53.83 OTHER FATIGUE: ICD-10-CM

## 2023-10-05 LAB
ALBUMIN SERPL BCG-MCNC: 4.1 G/DL (ref 3.5–5.2)
ALP SERPL-CCNC: 94 U/L (ref 35–104)
ALT SERPL W P-5'-P-CCNC: 14 U/L (ref 0–50)
ANION GAP SERPL CALCULATED.3IONS-SCNC: 9 MMOL/L (ref 7–15)
AST SERPL W P-5'-P-CCNC: 16 U/L (ref 0–45)
BILIRUB SERPL-MCNC: 0.3 MG/DL
BUN SERPL-MCNC: 10.8 MG/DL (ref 6–20)
CALCIUM SERPL-MCNC: 8.9 MG/DL (ref 8.6–10)
CHLORIDE SERPL-SCNC: 103 MMOL/L (ref 98–107)
CREAT SERPL-MCNC: 0.69 MG/DL (ref 0.51–0.95)
DEPRECATED HCO3 PLAS-SCNC: 27 MMOL/L (ref 22–29)
EGFRCR SERPLBLD CKD-EPI 2021: >90 ML/MIN/1.73M2
ERYTHROCYTE [DISTWIDTH] IN BLOOD BY AUTOMATED COUNT: 12.5 % (ref 10–15)
FASTING STATUS PATIENT QL REPORTED: NO
FERRITIN SERPL-MCNC: 114 NG/ML (ref 6–175)
FSH SERPL IRP2-ACNC: 6.1 MIU/ML
GLUCOSE SERPL-MCNC: 112 MG/DL (ref 70–99)
GLUCOSE SERPL-MCNC: 66 MG/DL (ref 70–99)
GLUCOSE SERPL-MCNC: 90 MG/DL (ref 70–99)
GLUCOSE SERPL-MCNC: 91 MG/DL (ref 70–99)
HCT VFR BLD AUTO: 38.2 % (ref 35–47)
HGB BLD-MCNC: 13.3 G/DL (ref 11.7–15.7)
INSULIN SERPL-ACNC: 10.6 UU/ML (ref 2.6–24.9)
INSULIN SERPL-ACNC: 34.5 UU/ML (ref 2.6–24.9)
INSULIN SERPL-ACNC: 37.9 UU/ML (ref 2.6–24.9)
INSULIN SERPL-ACNC: 43.5 UU/ML (ref 2.6–24.9)
INSULIN SERPL-ACNC: 50.3 UU/ML (ref 2.6–24.9)
IRON BINDING CAPACITY (ROCHE): 280 UG/DL (ref 240–430)
IRON SATN MFR SERPL: 14 % (ref 15–46)
IRON SERPL-MCNC: 40 UG/DL (ref 37–145)
LH SERPL-ACNC: 11.2 MIU/ML
MCH RBC QN AUTO: 31 PG (ref 26.5–33)
MCHC RBC AUTO-ENTMCNC: 34.8 G/DL (ref 31.5–36.5)
MCV RBC AUTO: 89 FL (ref 78–100)
MIS SERPL-MCNC: 5.63 NG/ML (ref 0.89–9.9)
NON GESTATIONAL GTT 2 HR POST DOSE: 72 MG/DL (ref 60–199)
NON GESTATIONAL GTT FASTING: 90 MG/DL (ref 60–125)
PLATELET # BLD AUTO: 230 10E3/UL (ref 150–450)
POTASSIUM SERPL-SCNC: 4 MMOL/L (ref 3.4–5.3)
PROLACTIN SERPL 3RD IS-MCNC: 21 NG/ML (ref 5–23)
PROT SERPL-MCNC: 6.9 G/DL (ref 6.4–8.3)
RBC # BLD AUTO: 4.29 10E6/UL (ref 3.8–5.2)
SODIUM SERPL-SCNC: 139 MMOL/L (ref 135–145)
TSH SERPL DL<=0.005 MIU/L-ACNC: 1.32 UIU/ML (ref 0.3–4.2)
VIT B12 SERPL-MCNC: 987 PG/ML (ref 232–1245)
VIT D+METAB SERPL-MCNC: 39 NG/ML (ref 20–50)
WBC # BLD AUTO: 7.8 10E3/UL (ref 4–11)

## 2023-10-05 PROCEDURE — 36415 COLL VENOUS BLD VENIPUNCTURE: CPT

## 2023-10-05 PROCEDURE — 84403 ASSAY OF TOTAL TESTOSTERONE: CPT

## 2023-10-05 PROCEDURE — 84146 ASSAY OF PROLACTIN: CPT

## 2023-10-05 PROCEDURE — 83525 ASSAY OF INSULIN: CPT | Mod: 91

## 2023-10-05 PROCEDURE — 85027 COMPLETE CBC AUTOMATED: CPT

## 2023-10-05 PROCEDURE — 82607 VITAMIN B-12: CPT

## 2023-10-05 PROCEDURE — 83002 ASSAY OF GONADOTROPIN (LH): CPT

## 2023-10-05 PROCEDURE — 83540 ASSAY OF IRON: CPT

## 2023-10-05 PROCEDURE — 83498 ASY HYDROXYPROGESTERONE 17-D: CPT

## 2023-10-05 PROCEDURE — 83001 ASSAY OF GONADOTROPIN (FSH): CPT

## 2023-10-05 PROCEDURE — 84270 ASSAY OF SEX HORMONE GLOBUL: CPT

## 2023-10-05 PROCEDURE — 82947 ASSAY GLUCOSE BLOOD QUANT: CPT | Mod: 91

## 2023-10-05 PROCEDURE — 82306 VITAMIN D 25 HYDROXY: CPT

## 2023-10-05 PROCEDURE — 83550 IRON BINDING TEST: CPT

## 2023-10-05 PROCEDURE — 82728 ASSAY OF FERRITIN: CPT

## 2023-10-05 PROCEDURE — 83520 IMMUNOASSAY QUANT NOS NONAB: CPT

## 2023-10-05 PROCEDURE — 82950 GLUCOSE TEST: CPT

## 2023-10-05 PROCEDURE — 82157 ASSAY OF ANDROSTENEDIONE: CPT | Mod: 90

## 2023-10-05 PROCEDURE — 84443 ASSAY THYROID STIM HORMONE: CPT

## 2023-10-05 PROCEDURE — 80053 COMPREHEN METABOLIC PANEL: CPT

## 2023-10-05 PROCEDURE — 82627 DEHYDROEPIANDROSTERONE: CPT

## 2023-10-06 LAB
DHEA-S SERPL-MCNC: 176 UG/DL (ref 35–430)
SHBG SERPL-SCNC: 32 NMOL/L (ref 30–135)

## 2023-10-08 LAB — ANDROST SERPL-MCNC: 1.12 NG/ML

## 2023-10-09 LAB
TESTOST FREE SERPL-MCNC: 0.31 NG/DL
TESTOST SERPL-MCNC: 17 NG/DL (ref 8–60)

## 2023-10-16 ENCOUNTER — OFFICE VISIT (OUTPATIENT)
Dept: FAMILY MEDICINE | Facility: OTHER | Age: 28
End: 2023-10-16
Attending: FAMILY MEDICINE
Payer: COMMERCIAL

## 2023-10-16 VITALS
SYSTOLIC BLOOD PRESSURE: 117 MMHG | BODY MASS INDEX: 27.84 KG/M2 | HEART RATE: 75 BPM | DIASTOLIC BLOOD PRESSURE: 77 MMHG | WEIGHT: 148.56 LBS | OXYGEN SATURATION: 98 % | TEMPERATURE: 97.9 F

## 2023-10-16 DIAGNOSIS — E28.2 PCOS (POLYCYSTIC OVARIAN SYNDROME): Primary | ICD-10-CM

## 2023-10-16 DIAGNOSIS — Z31.61 PROCREATIVE COUNSELING AND ADVICE USING NATURAL FAMILY PLANNING: ICD-10-CM

## 2023-10-16 DIAGNOSIS — N76.0 BV (BACTERIAL VAGINOSIS): ICD-10-CM

## 2023-10-16 DIAGNOSIS — B96.89 BV (BACTERIAL VAGINOSIS): ICD-10-CM

## 2023-10-16 DIAGNOSIS — E61.1 LOW IRON: ICD-10-CM

## 2023-10-16 DIAGNOSIS — R79.89 ELEVATED PROLACTIN LEVEL: ICD-10-CM

## 2023-10-16 LAB — 17OHP SERPL-MCNC: 53 NG/DL

## 2023-10-16 PROCEDURE — 99215 OFFICE O/P EST HI 40 MIN: CPT | Performed by: FAMILY MEDICINE

## 2023-10-16 RX ORDER — FERROUS SULFATE 325(65) MG
325 TABLET ORAL DAILY
Qty: 90 TABLET | Refills: 0 | Status: SHIPPED | OUTPATIENT
Start: 2023-10-16

## 2023-10-16 RX ORDER — METRONIDAZOLE 7.5 MG/G
1 GEL VAGINAL AT BEDTIME
Qty: 70 G | Refills: 0 | Status: SHIPPED | OUTPATIENT
Start: 2023-10-16

## 2023-10-16 RX ORDER — CABERGOLINE 0.5 MG/1
0.5 TABLET ORAL WEEKLY
Qty: 12 TABLET | Refills: 1 | Status: SHIPPED | OUTPATIENT
Start: 2023-10-16 | End: 2024-03-25

## 2023-10-16 RX ORDER — PIOGLITAZONEHYDROCHLORIDE 15 MG/1
15 TABLET ORAL DAILY
Qty: 30 TABLET | Refills: 1 | Status: SHIPPED | OUTPATIENT
Start: 2023-10-16 | End: 2024-03-25 | Stop reason: DRUGHIGH

## 2023-10-16 ASSESSMENT — PAIN SCALES - GENERAL: PAINLEVEL: NO PAIN (0)

## 2023-10-16 NOTE — PROGRESS NOTES
Assessment & Plan     PCOS (polycystic ovarian syndrome)--clinical signs and syptoms--12/2018  Didn't tolerate metformin, labs still consistent with PCOS, should be better if being treated appropriately  Stop prenatal  Start inositol bid, actos, optivite pmt 3 bid  Recommend starting to keep track of cycles with FEMM bon  Follow-up 2-3 mos  - pioglitazone (ACTOS) 15 MG tablet; Take 1 tablet (15 mg) by mouth daily    Elevated prolactin level  Discussed level, start cabergoline and do labs in 2 mos fasting before 8am  Follow-up 2-3 mos  - cabergoline (DOSTINEX) 0.5 MG tablet; Take 1 tablet (0.5 mg) by mouth once a week    Low iron  Slight low, has been tired, start daily with vit c for 2-3 mos  - ferrous sulfate (FEROSUL) 325 (65 Fe) MG tablet; Take 1 tablet (325 mg) by mouth daily    BV (bacterial vaginosis)  Will treat based on previous wet prep and history  - metroNIDAZOLE (METROGEL) 0.75 % vaginal gel; Place 1 applicator (5 g) vaginally at bedtime    Procreative counseling and advice using natural family planning  Encouraged charting to help with fertility focused intercourse  Follow-up 2-3 mos    Encouraged waiting until cycles improve but if gets pregnant before then, let us know right away so we can get labs and pending results start progesterone      40 minutes spent by me on the date of the encounter doing chart review, history and exam, documentation and further activities per the note       Patient was agreeable to this plan and had no further questions.  Patient Instructions    Geraldo -Inositol 2000mg 2x/day  Cabergoline 0.5mg weekly  Vitamin d 20,000 international unit(s) weekly OTC with a meal with fat in it  Iron daily for 3 mos  Labs fasting before 8am on day 2, 3 or 4 of your period  Stop prenatal and switch to optivite PMT 3 tabs 2x/day  FEMM bon   Treat BV with metrogel    No follow-ups on file.    Ibeth Reynolds MD  [facility] - ZEINAB Ragland is a 28 year old,  presenting for the following health issues:  Follow Up        10/16/2023     8:20 AM   Additional Questions   Roomed by Bella Gilmore   Accompanied by None         10/16/2023     8:20 AM   Patient Reported Additional Medications   Patient reports taking the following new medications None       HPI     Concern - Follow Up   Description: Following up on infertility and labs. Labs done on 10/05/2023  Progression of Symptoms:  N/A   Accompanying Signs & Symptoms: N/A   Previous history of similar problem: N/A   Precipitating factors:        Worsened by: N/A   Alleviating factors:        Improved by: N/A   Therapies tried and outcome: Patient following up for lab results from 10/5/2023      Review of Systems   Constitutional, HEENT, cardiovascular, pulmonary, GI, , musculoskeletal, neuro, skin, endocrine and psych systems are negative, except as otherwise noted.      Objective    /77 (BP Location: Left arm, Patient Position: Sitting, Cuff Size: Adult Regular)   Pulse 75   Temp 97.9  F (36.6  C) (Tympanic)   Wt 67.4 kg (148 lb 9 oz)   LMP 10/03/2023 (Exact Date)   SpO2 98%   BMI 27.84 kg/m    Body mass index is 27.84 kg/m .  Physical Exam   GENERAL: healthy, alert and no distress  RESP: lungs clear to auscultation - no rales, rhonchi or wheezes  CV: regular rate and rhythm, normal S1 S2, no S3 or S4, no murmur, click or rub, no peripheral edema and peripheral pulses strong  PSYCH: mentation appears normal, affect normal/bright    No results found for any visits on 10/16/23.

## 2023-10-16 NOTE — PATIENT INSTRUCTIONS
Geraldo -Inositol 2000mg 2x/day  Cabergoline 0.5mg weekly  Vitamin d 20,000 international unit(s) weekly OTC with a meal with fat in it  Iron daily for 3 mos  Labs fasting before 8am on day 2, 3 or 4 of your period  Stop prenatal and switch to optivite PMT 3 tabs 2x/day  FEMM bon   Treat BV with metrogel

## 2024-01-04 ENCOUNTER — VIRTUAL VISIT (OUTPATIENT)
Dept: FAMILY MEDICINE | Facility: OTHER | Age: 29
End: 2024-01-04
Attending: FAMILY MEDICINE
Payer: COMMERCIAL

## 2024-01-04 DIAGNOSIS — F33.0 MILD EPISODE OF RECURRENT MAJOR DEPRESSIVE DISORDER (H): Primary | ICD-10-CM

## 2024-01-04 DIAGNOSIS — E28.2 PCOS (POLYCYSTIC OVARIAN SYNDROME): ICD-10-CM

## 2024-01-04 DIAGNOSIS — R79.89 ELEVATED PROLACTIN LEVEL: ICD-10-CM

## 2024-01-04 PROCEDURE — 99443 PR PHYSICIAN TELEPHONE EVALUATION 21-30 MIN: CPT | Mod: 93 | Performed by: FAMILY MEDICINE

## 2024-01-04 RX ORDER — SERTRALINE HYDROCHLORIDE 100 MG/1
100 TABLET, FILM COATED ORAL DAILY
Qty: 30 TABLET | Refills: 1 | Status: SHIPPED | OUTPATIENT
Start: 2024-01-04 | End: 2024-03-25

## 2024-01-04 ASSESSMENT — ANXIETY QUESTIONNAIRES
5. BEING SO RESTLESS THAT IT IS HARD TO SIT STILL: NOT AT ALL
6. BECOMING EASILY ANNOYED OR IRRITABLE: MORE THAN HALF THE DAYS
GAD7 TOTAL SCORE: 15
7. FEELING AFRAID AS IF SOMETHING AWFUL MIGHT HAPPEN: NEARLY EVERY DAY
IF YOU CHECKED OFF ANY PROBLEMS ON THIS QUESTIONNAIRE, HOW DIFFICULT HAVE THESE PROBLEMS MADE IT FOR YOU TO DO YOUR WORK, TAKE CARE OF THINGS AT HOME, OR GET ALONG WITH OTHER PEOPLE: VERY DIFFICULT
2. NOT BEING ABLE TO STOP OR CONTROL WORRYING: NEARLY EVERY DAY
1. FEELING NERVOUS, ANXIOUS, OR ON EDGE: NEARLY EVERY DAY
8. IF YOU CHECKED OFF ANY PROBLEMS, HOW DIFFICULT HAVE THESE MADE IT FOR YOU TO DO YOUR WORK, TAKE CARE OF THINGS AT HOME, OR GET ALONG WITH OTHER PEOPLE?: VERY DIFFICULT
GAD7 TOTAL SCORE: 15
4. TROUBLE RELAXING: MORE THAN HALF THE DAYS
7. FEELING AFRAID AS IF SOMETHING AWFUL MIGHT HAPPEN: NEARLY EVERY DAY
3. WORRYING TOO MUCH ABOUT DIFFERENT THINGS: MORE THAN HALF THE DAYS
GAD7 TOTAL SCORE: 15

## 2024-01-04 ASSESSMENT — PATIENT HEALTH QUESTIONNAIRE - PHQ9
10. IF YOU CHECKED OFF ANY PROBLEMS, HOW DIFFICULT HAVE THESE PROBLEMS MADE IT FOR YOU TO DO YOUR WORK, TAKE CARE OF THINGS AT HOME, OR GET ALONG WITH OTHER PEOPLE: VERY DIFFICULT
SUM OF ALL RESPONSES TO PHQ QUESTIONS 1-9: 20
SUM OF ALL RESPONSES TO PHQ QUESTIONS 1-9: 20

## 2024-01-04 NOTE — PATIENT INSTRUCTIONS
Psychologists/ Counselors                        Lane Parker          ...            ..[phone]  [facility]                    ..  [phone]  [facility]                 .[phone]  [facility]kids)       .         [phone]  [facility]teens)                ..[phone]  [facility]                    [phone]  *[facility]                   [phone]  [facility]           ...      .. [phone]  [facility]                ...[phone]  *[facility]     ...          ...[phone]  [facility]               [phone]   *[facility]..            .[phone]  [facility]                       [phone]  [facility]               .    [phone]  [facility]                ..[phone]  [facility]     .      . ..[phone]  [facility].....................................................................................[phone]    **EMDR            [facility]              .   [phone]  [facility]              .  ...[phone]  [facility]              .   ..[phone]  [facility]           ...      .. [phone]  **[facility]              . ..[phone]  [facility]              .    ..[phone]  [facility]              .     .. [phone]  **[facility]                 ... [phone]  [facility]                         .[phone]  [facility]     .      . ..[phone]  **[facility]...............................................................[phone]  [facility].....................................................................................[phone]                                              Marshal [facility]i                ..  .  [phone]  *[facility]         .     .  [phone]  [facility] Dr. MICHAEL  Rao              . [phone]  *[facility]  ..        [phone]  *[facility]              .    [phone]    [facility]               ...  .  [phone]  [facility]             . [phone]  [facility]            [phone]  [facility]     .      . ..[phone]               [facility]....................................................[phone]          *Facilities in bold italics indicate medication management  Services are offered.     Crisis support    If you or someone you know is struggling or in crisis, help is available. Call or text 425 or chat [email or web address]    The volunteer Crisis Counselor will help you move from a 'hot moment to a cool moment'

## 2024-01-04 NOTE — PROGRESS NOTES
Obie is a 28 year old who is being evaluated via a billable telephone visit.      What phone number would you like to be contacted at? [phone]  How would you like to obtain your AVS? Amelie    Distant Location (provider location):  On-site    Assessment & Plan     Mild episode of recurrent major depressive disorder (H24)  Increase zoloft 100mg daily and follow-up 2 mos  - sertraline (ZOLOFT) 100 MG tablet; Take 1 tablet (100 mg) by mouth daily    Elevated prolactin level  Recheck in 2 mos as she is just starting her cabergoline in the last week  - Prolactin; Standing    PCOS (polycystic ovarian syndrome)--clinical signs and syptoms--12/2018  Restarting everything now as she had taken a break after her mom passed    Grief -- discussed, she has a great book that has been helpful, discussed finding a therapist  Increase in zoloft and follow-up 2 mos      Provider  Link to [facility] Help Grid :638102}    Patient was agreeable to this plan and had no further questions.  Patient Instructions     Psychologists/ Counselors                        Lane Parker          ...            ..[phone]  [facility]                    ..  [phone]  [facility]                 .[phone]  [facility]kids)       .         [phone]  [facility]te[facility]                ..[phone]  [facility]                    [phone]  *[facility]                   [phone]  [facility]           ...      .. [phone]  [facility]                ...[phone]  *[facility]     ...          ...[phone]  [facility]               [phone]   *[facility]..            .[phone]  [facility]                       .[phone]  [facility]               .    [phone]  [facility]                ..[phone]  [facility]  [facility]     .      . ..[phone]  [facility].....................................................................................[phone]    **EMDR            [facility]              .   [phone]  [facility]              .  ...[phone]  [facility]              .   ..[phone]  [facility]           ...      .. [phone]  **[facility]              . ..[phone]  [facility]              .    ..[phone]  [facility]              .     .. [phone]  **[facility]                 ... [phone]  [facility]                         .[phone]  [facility]     .      . ..[phone]  **[facility]...............................................................[phone]  [facility].....................................................................................[phone]                                              [facility]                ..  .  [phone]  *[facility]         .     .  [phone]  [facility] Dr. ALEC Yeh              . [phone]  *[facility]  ..        [phone]  *[facility]              .    [phone]    [facility]               ...  .  [phone]  [facility]             . [phone]  [facility]            [phone]  [facility]     .      . ..[phone]               [facility]  * [facility]....................................................[phone]          *Facilities in bold italics indicate medication management  Services are offered.     Crisis support    If you or someone you know is struggling or in crisis, help is available. Call or text 068 or chat [email or web address]    The volunteer Crisis Counselor will help you move from a 'hot moment to a cool moment'      No follow-ups on file.    Ibeth Reynolds MD  [place]  [facility] - ZEINAB    [facility]   Obie is a 28 year old, presenting for the following health issues:  PCOS         1/4/2024     1:31 PM   Additional Questions   Roomed by Bella Gilmore   Accompanied by None         1/4/2024     1:31 PM   Patient Reported Additional Medications   Patient reports taking the following new medications None       HPI   Provider Documentation  Link to [facility] Flow[facility] :[id number]}    Clinic Visit  Date of visit: 1/4/2024   Chief Complaint:   Chief Complaint   Patient presents with    PCOS        HPI:   Obie Webster is a 28 year old No obstetric history on file. female who presents to clinic today for follow up  for PCOS .     Menarche: age 15  Menses: irregular . No LMP recorded. (Menstrual status: Irregular Periods).   Family Planning Chart: No.  Acne: Yes: .  Hirsutism (facial hair): Yes: .  Male-pattern hair loss: No.  Elevated serum androgen:   Lab Results   Component Value Date    DHEA 176 10/05/2023    TESTOSTTOTAL 17 10/05/2023    FT 0.31 10/05/2023    SHIRA 1.119 10/05/2023   ]  BMI: There is no height or weight on file to calculate BMI.   Type 2 DM: No.  Elevated Cholesterol: No.  Mood disorder: Yes: depression & anxiety .    Gynecologic History:  Last Pap:   Lab Results   Component Value Date    PAP NIL 11/06/2019      History of abnormal pap: No.    Obstetric History:   OB History   No obstetric history on file.     Obstetric history significant for:  n/a      Depression and Anxiety Follow-Up  How are you doing with your depression since your last visit? Worsened   How are you doing with your anxiety since your last visit?  Worsened   Are you having other symptoms that might be associated with depression or anxiety? Yes:  falling asleep and staying asleep, crying a lot   Have you had a significant life event? Grief or Loss   Do you have any concerns with your use of alcohol or other drugs? No    Social History     Tobacco Use    Smoking status:  Never     Passive exposure: Never    Smokeless tobacco: Never   Vaping Use    Vaping Use: Never used   Substance Use Topics    Alcohol use: Yes     Comment: Occasionally    Drug use: Never         2/7/2023     8:00 AM 10/4/2023     4:21 PM 1/4/2024     7:46 AM   PHQ   PHQ-9 Total Score 10 5 20   Q9: Thoughts of better off dead/self-harm past 2 weeks Not at all Not at all Several days   F/U: Thoughts of suicide or self-harm   No   F/U: Safety concerns   No         2/7/2023     8:00 AM 10/4/2023     4:22 PM 1/4/2024     7:47 AM   SONNY-7 SCORE   Total Score  1 (minimal anxiety) 15 (severe anxiety)   Total Score 4 1 15         1/4/2024     7:46 AM   Last PHQ-9   1.  Little interest or pleasure in doing things 3   2.  Feeling down, depressed, or hopeless 3   3.  Trouble falling or staying asleep, or sleeping too much 3   4.  Feeling tired or having little energy 3   5.  Poor appetite or overeating 2   6.  Feeling bad about yourself 3   7.  Trouble concentrating 2   8.  Moving slowly or restless 0   Q9: Thoughts of better off dead/self-harm past 2 weeks 1   PHQ-9 Total Score 20   In the past two weeks have you had thoughts of suicide or self harm? No   Do you have concerns about your personal safety or the safety of others? No         1/4/2024     7:47 AM   SONNY-7    1. Feeling nervous, anxious, or on edge 3   2. Not being able to stop or control worrying 3   3. Worrying too much about different things 2   4. Trouble relaxing 2   5. Being so restless that it is hard to sit still 0   6. Becoming easily annoyed or irritable 2   7. Feeling afraid, as if something awful might happen 3   SONNY-7 Total Score 15   If you checked any problems, how difficult have they made it for you to do your work, take care of things at home, or get along with other people? Very difficult       Suicide Assessment Five-step Evaluation and Treatment (SAFE-T)    Medications:  cabergoline (DOSTINEX) 0.5 MG tablet, Take 1 tablet (0.5 mg) by mouth once a  week  clindamycin (CLEOCIN T) 1 % external solution, Apply topically 2 times daily  coenzyme Q-10 200 MG CAPS capsule, Take  by mouth. 1 daily  ferrous sulfate (FEROSUL) 325 (65 Fe) MG tablet, Take 1 tablet (325 mg) by mouth daily  metroNIDAZOLE (METROGEL) 0.75 % vaginal gel, Place 1 applicator (5 g) vaginally at bedtime  Omega-3 Fatty Acids (FISH OIL) 1200 MG capsule, Take 1,200 mg by mouth  pioglitazone (ACTOS) 15 MG tablet, Take 1 tablet (15 mg) by mouth daily  Prenatal Vit-Fe Fumarate-FA (PRENATAL VITAMIN) 27-0.8 MG TABS,   traZODone (DESYREL) 50 MG tablet, TAKE 1/2 TO 1 TABLET BY MOUTH AT BEDTIME AS NEEDED FOR SLEEP  valACYclovir (VALTREX) 500 MG tablet, Take 1 tablet (500 mg) by mouth 2 times daily  sertraline (ZOLOFT) 50 MG tablet, Take 1 tablet (50 mg) by mouth daily (Patient not taking: Reported on 1/4/2024)    No current facility-administered medications on file prior to visit.      Allergy:  Allergies   Allergen Reactions    Sulfa Antibiotics Hives    Metformin GI Disturbance       Medical History:  Past Medical History:   Diagnosis Date    Cystic acne 02/12/2021    Depressive disorder        Surgical History:  Past Surgical History:   Procedure Laterality Date    ADENOIDECTOMY      BIOPSY      COLONOSCOPY N/A 04/09/2021    Procedure: COLONOSCOPY;  Surgeon: Sd Vaca MD;  [facility]    [email or web address] RAD RESEC TONSIL/PILLARS         Social History:  Social History    Substance and Sexual Activity      Alcohol use: Yes        Comment: Occasionally    History   Smoking Status    Never   Smokeless Tobacco    Never     History   Drug Use Unknown     History   Sexual Activity    Sexual activity: Yes    Partners: Male    Birth control/ protection: None     Relationship status is: .      Review of Systems:    GENERAL: No change in weight, sleep or appetite.  Normal energy.  No fever or chills  EYES: Negative for vision changes or eye problems  ENT: No problems with ears, nose or throat.  No  difficulty swallowing.  RESP: No coughing, wheezing or shortness of breath  CV: No chest pains or palpitations  GI: No nausea, vomiting,  heartburn, abdominal pain, diarrhea, constipation or change in bowel habits  : No urinary frequency or dysuria, bladder or kidney problems  MUSCULOSKELETAL: No significant muscle or joint pains  NEUROLOGIC: No headaches, numbness, tingling, weakness, problems with balance or coordination  PSYCHIATRIC: No problems with anxiety, depression or mental health  HEME/IMMUNE/ALLERGY: No history of bleeding or clotting problems or anemia.  No allergies or immune system problems  ENDOCRINE: No history of thyroid disease, diabetes or other endocrine disorders  SKIN: No rashes,worrisome lesions or skin problems    Objective           Vitals:  No vitals were obtained today due to virtual visit.    Physical Exam   healthy, alert, and no distress  PSYCH: Alert and oriented times 3; coherent speech, normal   rate and volume, able to articulate logical thoughts, able   to abstract reason, no tangential thoughts, no hallucinations   or delusions  Her affect is normal and pleasant  RESP: No cough, no audible wheezing, able to talk in full sentences  Remainder of exam unable to be completed due to telephone visits    No results found for any visits on 01/04/24.      Phone call duration: 23 minutes      Answers submitted by the patient for this visit:  Patient Health Questionnaire (Submitted on 1/4/2024)  If you checked off any problems, how difficult have these problems made it for you to do your work, take care of things at home, or get along with other people?: Very difficult  PHQ9 TOTAL SCORE: 20  SONNY-7 (Submitted on 1/4/2024)  SONNY 7 TOTAL SCORE: 15

## 2024-03-19 ENCOUNTER — MYC MEDICAL ADVICE (OUTPATIENT)
Dept: FAMILY MEDICINE | Facility: OTHER | Age: 29
End: 2024-03-19

## 2024-03-22 ENCOUNTER — LAB (OUTPATIENT)
Dept: LAB | Facility: OTHER | Age: 29
End: 2024-03-22
Payer: COMMERCIAL

## 2024-03-22 DIAGNOSIS — R79.89 ELEVATED PROLACTIN LEVEL: ICD-10-CM

## 2024-03-22 PROCEDURE — 84146 ASSAY OF PROLACTIN: CPT

## 2024-03-22 PROCEDURE — 36415 COLL VENOUS BLD VENIPUNCTURE: CPT

## 2024-03-23 LAB — PROLACTIN SERPL 3RD IS-MCNC: 2 NG/ML (ref 5–23)

## 2024-03-23 ASSESSMENT — ANXIETY QUESTIONNAIRES
4. TROUBLE RELAXING: NOT AT ALL
5. BEING SO RESTLESS THAT IT IS HARD TO SIT STILL: NOT AT ALL
6. BECOMING EASILY ANNOYED OR IRRITABLE: MORE THAN HALF THE DAYS
3. WORRYING TOO MUCH ABOUT DIFFERENT THINGS: SEVERAL DAYS
7. FEELING AFRAID AS IF SOMETHING AWFUL MIGHT HAPPEN: SEVERAL DAYS
8. IF YOU CHECKED OFF ANY PROBLEMS, HOW DIFFICULT HAVE THESE MADE IT FOR YOU TO DO YOUR WORK, TAKE CARE OF THINGS AT HOME, OR GET ALONG WITH OTHER PEOPLE?: SOMEWHAT DIFFICULT
2. NOT BEING ABLE TO STOP OR CONTROL WORRYING: SEVERAL DAYS
7. FEELING AFRAID AS IF SOMETHING AWFUL MIGHT HAPPEN: SEVERAL DAYS
GAD7 TOTAL SCORE: 7
1. FEELING NERVOUS, ANXIOUS, OR ON EDGE: MORE THAN HALF THE DAYS
IF YOU CHECKED OFF ANY PROBLEMS ON THIS QUESTIONNAIRE, HOW DIFFICULT HAVE THESE PROBLEMS MADE IT FOR YOU TO DO YOUR WORK, TAKE CARE OF THINGS AT HOME, OR GET ALONG WITH OTHER PEOPLE: SOMEWHAT DIFFICULT
GAD7 TOTAL SCORE: 7
GAD7 TOTAL SCORE: 7

## 2024-03-23 ASSESSMENT — PATIENT HEALTH QUESTIONNAIRE - PHQ9
10. IF YOU CHECKED OFF ANY PROBLEMS, HOW DIFFICULT HAVE THESE PROBLEMS MADE IT FOR YOU TO DO YOUR WORK, TAKE CARE OF THINGS AT HOME, OR GET ALONG WITH OTHER PEOPLE: SOMEWHAT DIFFICULT
SUM OF ALL RESPONSES TO PHQ QUESTIONS 1-9: 9
SUM OF ALL RESPONSES TO PHQ QUESTIONS 1-9: 9

## 2024-03-25 ENCOUNTER — OFFICE VISIT (OUTPATIENT)
Dept: FAMILY MEDICINE | Facility: OTHER | Age: 29
End: 2024-03-25
Attending: FAMILY MEDICINE
Payer: COMMERCIAL

## 2024-03-25 VITALS
HEIGHT: 61 IN | WEIGHT: 148.7 LBS | RESPIRATION RATE: 18 BRPM | DIASTOLIC BLOOD PRESSURE: 70 MMHG | HEART RATE: 83 BPM | OXYGEN SATURATION: 99 % | BODY MASS INDEX: 28.07 KG/M2 | TEMPERATURE: 97.5 F | SYSTOLIC BLOOD PRESSURE: 110 MMHG

## 2024-03-25 DIAGNOSIS — E61.1 LOW IRON: Primary | ICD-10-CM

## 2024-03-25 DIAGNOSIS — R79.89 ELEVATED PROLACTIN LEVEL: ICD-10-CM

## 2024-03-25 DIAGNOSIS — F33.0 MILD EPISODE OF RECURRENT MAJOR DEPRESSIVE DISORDER (H): ICD-10-CM

## 2024-03-25 DIAGNOSIS — E28.2 PCOS (POLYCYSTIC OVARIAN SYNDROME): ICD-10-CM

## 2024-03-25 LAB
FERRITIN SERPL-MCNC: 105 NG/ML (ref 6–175)
IRON BINDING CAPACITY (ROCHE): 281 UG/DL (ref 240–430)
IRON SATN MFR SERPL: 30 % (ref 15–46)
IRON SERPL-MCNC: 84 UG/DL (ref 37–145)

## 2024-03-25 PROCEDURE — 83540 ASSAY OF IRON: CPT | Performed by: FAMILY MEDICINE

## 2024-03-25 PROCEDURE — 82728 ASSAY OF FERRITIN: CPT | Performed by: FAMILY MEDICINE

## 2024-03-25 PROCEDURE — 36415 COLL VENOUS BLD VENIPUNCTURE: CPT | Performed by: FAMILY MEDICINE

## 2024-03-25 PROCEDURE — 83550 IRON BINDING TEST: CPT | Performed by: FAMILY MEDICINE

## 2024-03-25 PROCEDURE — 99215 OFFICE O/P EST HI 40 MIN: CPT | Performed by: FAMILY MEDICINE

## 2024-03-25 RX ORDER — NALTREXONE 100 %
POWDER (GRAM) MISCELLANEOUS
Qty: 100 G | Refills: 1 | Status: SHIPPED | OUTPATIENT
Start: 2024-03-25 | End: 2024-04-15

## 2024-03-25 RX ORDER — SERTRALINE HYDROCHLORIDE 100 MG/1
100 TABLET, FILM COATED ORAL DAILY
Qty: 90 TABLET | Refills: 2 | Status: SHIPPED | OUTPATIENT
Start: 2024-03-25

## 2024-03-25 RX ORDER — PIOGLITAZONEHYDROCHLORIDE 30 MG/1
30 TABLET ORAL DAILY
Qty: 90 TABLET | Refills: 1 | Status: SHIPPED | OUTPATIENT
Start: 2024-03-25

## 2024-03-25 RX ORDER — CABERGOLINE 0.5 MG/1
0.25 TABLET ORAL WEEKLY
Qty: 6 TABLET | Refills: 1 | Status: SHIPPED | OUTPATIENT
Start: 2024-03-25

## 2024-03-25 ASSESSMENT — PAIN SCALES - GENERAL: PAINLEVEL: NO PAIN (0)

## 2024-03-25 NOTE — PATIENT INSTRUCTIONS
[facility] [place] - [address] [phone]    [email or web address]      [email or web address]    [facility]

## 2024-03-25 NOTE — PROGRESS NOTES
"  Assessment & Plan     Mild episode of recurrent major depressive disorder (H24)  Continue current dose  Experiencing waves of grief regarding her mom as expected but closer with siblings  Reading book 'it's ok to not be ok'/book about grief  - sertraline (ZOLOFT) 100 MG tablet; Take 1 tablet (100 mg) by mouth daily    PCOS (polycystic ovarian syndrome)--clinical signs and syptoms--12/2018  She has had hot flashes, back acne, ovaries are trying to ovulate better  Increase actos to 30mg  Start naltrexone low dose  Consider jardiance next visit if not cycling  Suspect stress is still playing a role  Consider organic conceptions  Follow-up 2-3 mos  - pioglitazone (ACTOS) 30 MG tablet; Take 1 tablet (30 mg) by mouth daily  - Naltrexone POWD; 1.5 mg at bedtime for 7 days, THEN 3 mg at bedtime for 7 days, THEN 4.5 mg at bedtime for 7 days.    Elevated prolactin level  Decrease dose to 1/2 tablet weekly  - cabergoline (DOSTINEX) 0.5 MG tablet; Take 0.5 tablets (0.25 mg) by mouth once a week    Low iron  Might be reason stomach is upset, levels show we can stop but take weekly for now  - Iron and iron binding capacity  - Ferritin    45 minutes spent by me on the date of the encounter doing chart review, history and exam, documentation and further activities per the note      BMI  Estimated body mass index is 28.1 kg/m  as calculated from the following:    Height as of this encounter: 1.549 m (5' 1\").    Weight as of this encounter: 67.4 kg (148 lb 11.2 oz).   Weight management plan: Discussed healthy diet and exercise guidelines    Patient was agreeable to this plan and had no further questions.  Patient Instructions   [facility] - [address] [phone]    [email or web address]      [email or web address]    [facility]     No follow-ups on file.    Carlee Ragland is a 29 year old, presenting for the following health issues:  RECHECK        3/25/2024     8:31 " "AM   Additional Questions   Roomed by Keri Peterson LPN, CCP, MHP   Accompanied by self     HPI     Concern - Lab Results   Onset: 3/22/2024  Description: Discuss prolactin results from 3/22/2024.      concerned he won't have any offspring, age 34, normal semen analysis      Review of Systems  Constitutional, HEENT, cardiovascular, pulmonary, GI, , musculoskeletal, neuro, skin, endocrine and psych systems are negative, except as otherwise noted.      Objective    /70 (BP Location: Left arm, Patient Position: Sitting, Cuff Size: Adult Large)   Pulse 83   Temp 97.5  F (36.4  C) (Tympanic)   Resp 18   Ht 1.549 m (5' 1\")   Wt 67.4 kg (148 lb 11.2 oz)   LMP 01/23/2024 (Exact Date)   SpO2 99%   BMI 28.10 kg/m    Body mass index is 28.1 kg/m .  Physical Exam   GENERAL: alert and no distress  RESP: lungs clear to auscultation - no rales, rhonchi or wheezes  CV: regular rate and rhythm, normal S1 S2, no S3 or S4, no murmur, click or rub, no peripheral edema  MS: no gross musculoskeletal defects noted, no edema  PSYCH: mentation appears normal, affect normal/bright    Results for orders placed or performed in visit on 03/25/24   Iron and iron binding capacity     Status: Normal   Result Value Ref Range    Iron 84 37 - 145 ug/dL    Iron Binding Capacity 281 240 - 430 ug/dL    Iron Sat Index 30 15 - 46 %   Ferritin     Status: Normal   Result Value Ref Range    Ferritin 105 6 - 175 ng/mL           Signed Electronically by: Ibeth Reynolds MD    Answers submitted by the patient for this visit:  Patient Health Questionnaire (Submitted on 3/23/2024)  If you checked off any problems, how difficult have these problems made it for you to do your work, take care of things at home, or get along with other people?: Somewhat difficult  PHQ9 TOTAL SCORE: 9  SONNY-7 (Submitted on 3/23/2024)  SONNY 7 TOTAL SCORE: 7    "

## 2024-06-18 ENCOUNTER — ANCILLARY PROCEDURE (OUTPATIENT)
Dept: GENERAL RADIOLOGY | Facility: OTHER | Age: 29
End: 2024-06-18
Attending: FAMILY MEDICINE
Payer: COMMERCIAL

## 2024-06-18 ENCOUNTER — OFFICE VISIT (OUTPATIENT)
Dept: FAMILY MEDICINE | Facility: OTHER | Age: 29
End: 2024-06-18
Attending: FAMILY MEDICINE
Payer: COMMERCIAL

## 2024-06-18 VITALS
BODY MASS INDEX: 27.19 KG/M2 | TEMPERATURE: 96.9 F | WEIGHT: 144 LBS | HEART RATE: 94 BPM | RESPIRATION RATE: 16 BRPM | HEIGHT: 61 IN | OXYGEN SATURATION: 99 % | DIASTOLIC BLOOD PRESSURE: 58 MMHG | SYSTOLIC BLOOD PRESSURE: 102 MMHG

## 2024-06-18 DIAGNOSIS — B34.9 VIRAL SYNDROME: ICD-10-CM

## 2024-06-18 DIAGNOSIS — B34.9 VIRAL SYNDROME: Primary | ICD-10-CM

## 2024-06-18 LAB — SARS-COV-2 RNA RESP QL NAA+PROBE: NEGATIVE

## 2024-06-18 PROCEDURE — 87635 SARS-COV-2 COVID-19 AMP PRB: CPT | Performed by: FAMILY MEDICINE

## 2024-06-18 PROCEDURE — 71046 X-RAY EXAM CHEST 2 VIEWS: CPT | Mod: TC | Performed by: STUDENT IN AN ORGANIZED HEALTH CARE EDUCATION/TRAINING PROGRAM

## 2024-06-18 PROCEDURE — 99213 OFFICE O/P EST LOW 20 MIN: CPT | Performed by: FAMILY MEDICINE

## 2024-06-18 ASSESSMENT — ANXIETY QUESTIONNAIRES
GAD7 TOTAL SCORE: 5
1. FEELING NERVOUS, ANXIOUS, OR ON EDGE: MORE THAN HALF THE DAYS
7. FEELING AFRAID AS IF SOMETHING AWFUL MIGHT HAPPEN: NOT AT ALL
GAD7 TOTAL SCORE: 5
IF YOU CHECKED OFF ANY PROBLEMS ON THIS QUESTIONNAIRE, HOW DIFFICULT HAVE THESE PROBLEMS MADE IT FOR YOU TO DO YOUR WORK, TAKE CARE OF THINGS AT HOME, OR GET ALONG WITH OTHER PEOPLE: NOT DIFFICULT AT ALL
6. BECOMING EASILY ANNOYED OR IRRITABLE: SEVERAL DAYS
3. WORRYING TOO MUCH ABOUT DIFFERENT THINGS: SEVERAL DAYS
4. TROUBLE RELAXING: NOT AT ALL
GAD7 TOTAL SCORE: 5
2. NOT BEING ABLE TO STOP OR CONTROL WORRYING: SEVERAL DAYS
5. BEING SO RESTLESS THAT IT IS HARD TO SIT STILL: NOT AT ALL
8. IF YOU CHECKED OFF ANY PROBLEMS, HOW DIFFICULT HAVE THESE MADE IT FOR YOU TO DO YOUR WORK, TAKE CARE OF THINGS AT HOME, OR GET ALONG WITH OTHER PEOPLE?: NOT DIFFICULT AT ALL
7. FEELING AFRAID AS IF SOMETHING AWFUL MIGHT HAPPEN: NOT AT ALL

## 2024-06-18 ASSESSMENT — PAIN SCALES - GENERAL: PAINLEVEL: NO PAIN (0)

## 2024-06-18 ASSESSMENT — ENCOUNTER SYMPTOMS: COUGH: 1

## 2024-06-18 ASSESSMENT — PATIENT HEALTH QUESTIONNAIRE - PHQ9
SUM OF ALL RESPONSES TO PHQ QUESTIONS 1-9: 5
SUM OF ALL RESPONSES TO PHQ QUESTIONS 1-9: 5
10. IF YOU CHECKED OFF ANY PROBLEMS, HOW DIFFICULT HAVE THESE PROBLEMS MADE IT FOR YOU TO DO YOUR WORK, TAKE CARE OF THINGS AT HOME, OR GET ALONG WITH OTHER PEOPLE: NOT DIFFICULT AT ALL

## 2024-06-18 NOTE — PROGRESS NOTES
Assessment & Plan     Viral syndrome  Exam fairly negative except current sx below. Covid pcr pending.  Right now - await covid test. Symptomatic treatment was discussed along what is available for OTC medications for symptomatic relief.   Symptomatic treatment was discussed along when patient should call and/or come back into the clinic or go to ER/Urgent care. All questions answered. No work tomorrow and then ??  Consider treating if covid negative   - XR Chest 2 Views; Future  - Symptomatic COVID-19 Virus (Coronavirus) by PCR Nasopharyngeal          ADDENDUM-   Results for orders placed or performed in visit on 06/18/24   XR Chest 2 Views     Status: None    Narrative    Exam:  XR CHEST 2 VIEWS    HISTORY: Viral syndrome., Cough    COMPARISON:  None.    FINDINGS:     The cardiomediastinal contours are normal.      No focal consolidation, effusion, or pneumothorax.      No acute osseous abnormality.       Impression    IMPRESSION:      No acute cardiopulmonary process.      TANA QUAN MD         SYSTEM ID:  [id number]   Results for orders placed or performed in visit on 06/18/24   Symptomatic COVID-19 Virus (Coronavirus) by PCR Nasopharyngeal     Status: Normal    Specimen: Nasopharyngeal; Swab   Result Value Ref Range    SARS CoV2 PCR Negative Negative    Narrative    Testing was performed using the Xpert Xpress SARS-CoV-2 Assay on the Cepheid Gene-Xpert Instrument Systems. Additional information about this Emergency Use Authorization (EUA) assay can be found via the Lab Guide. This test should be ordered for the detection of SARS-CoV-2 in individuals who meet SARS-CoV-2 clinical and/or epidemiological criteria as well as from individuals without symptoms or other reasons to suspect COVID-19. Test performance for asymptomatic patients has only been established in anterior nasal swab specimens. This test is for in vitro diagnostic use under the FDA EUA for laboratories certified under CLIA to perform high  complexity testing. This test has not been FDA cleared or approved. A negative result does not rule out the presence of PCR inhibitors in the specimen or target RNA concentration below the limit of detection for the assay. The possibility of a false negative should be considered if the patient's recent exposure or clinical presentation suggests COVID-19. This test was validated by [facility] laboratory. This laboratory is certified under the Clinical Laboratory Improvement Amendments (CLIA) as qualified to perform high complexity testing.           No follow-ups on file.    Carlee Ragland is a 29 year old, presenting for the following health issues:  Cough        6/18/2024     2:38 PM   Additional Questions   Roomed by April   Accompanied by self         6/18/2024     2:38 PM   Patient Reported Additional Medications   Patient reports taking the following new medications none     Cough    History of Present Illness       Reason for visit:  Illness  Symptom onset:  1-3 days ago  Symptoms include:  Cough, congestion, ear pain  Symptom intensity:  Severe  Symptom progression:  Worsening  Had these symptoms before:  Yes  Has tried/received treatment for these symptoms:  No  What makes it worse:  None  What makes it better:  None    She eats 0-1 servings of fruits and vegetables daily.She consumes 1 sweetened beverage(s) daily.She exercises with enough effort to increase her heart rate 9 or less minutes per day.  She exercises with enough effort to increase her heart rate 3 or less days per week. She is missing 4 dose(s) of medications per week.  She is not taking prescribed medications regularly due to remembering to take.       Acute Illness  Acute illness concerns: COVID exposure, cough congestion   Onset/Duration: 3 days  Symptoms:  Fever: No  Chills/Sweats: YES  Headache (location?): YES- global   Sinus Pressure: YES  Conjunctivitis:  YES  Ear Pain: YES: left  Rhinorrhea: YES  Congestion:  "YES  Sore Throat: YES  Cough: YES  Wheeze: YES  Decreased Appetite: YES  Nausea: No  Vomiting: No  Diarrhea: No  Dysuria/Freq.: No  Dysuria or Hematuria: No  Fatigue/Achiness: YES  Sick/Strep Exposure: YES  Therapies tried and outcome: mucinex, Nyquil tylenol     Exposed 4 -5 days ago                Review of Systems  Constitutional, HEENT, cardiovascular, pulmonary, gi and gu systems are negative, except as otherwise noted.      Objective    /58 (BP Location: Right arm, Patient Position: Sitting, Cuff Size: Adult Regular)   Pulse 94   Temp 96.9  F (36.1  C) (Tympanic)   Resp 16   Ht 1.549 m (5' 1\")   Wt 65.3 kg (144 lb)   SpO2 99%   BMI 27.21 kg/m    There is no height or weight on file to calculate BMI.  Physical Exam   GENERAL: alert and no distress  EYES: Eyes grossly normal to inspection, PERRL and conjunctivae and sclerae normal  HENT: ear canals and TM's normal, nose and mouth without ulcers or lesions  NECK: no adenopathy, no asymmetry, masses, or scars  RESP: lungs clear to auscultation - no rales, rhonchi or wheezes  CV: regular rate and rhythm, normal S1 S2, no S3 or S4, no murmur, click or rub, no peripheral edema  ABDOMEN: soft, nontender, no hepatosplenomegaly, no masses and bowel sounds normal            Signed Electronically by: Dom Maradiaga MD    "

## 2024-06-18 NOTE — LETTER
June 18, 2024      Obie Webster  [address]  [place] [address]        To Whom It May Concern:    Obie Webster  was seen on today .  Please excuse her  until ? 6/20 due to illness.        Sincerely,        Dom Maradiaga MD

## 2024-07-14 ENCOUNTER — HEALTH MAINTENANCE LETTER (OUTPATIENT)
Age: 29
End: 2024-07-14

## 2024-07-26 ENCOUNTER — MYC MEDICAL ADVICE (OUTPATIENT)
Dept: FAMILY MEDICINE | Facility: OTHER | Age: 29
End: 2024-07-26

## 2024-07-26 DIAGNOSIS — L08.9 LOCAL INFECTION OF SKIN AND SUBCUTANEOUS TISSUE: Primary | ICD-10-CM

## 2024-07-26 RX ORDER — CEPHALEXIN 500 MG/1
500 CAPSULE ORAL 2 TIMES DAILY
Qty: 14 CAPSULE | Refills: 0 | Status: SHIPPED | OUTPATIENT
Start: 2024-07-26 | End: 2024-08-02

## 2024-10-10 ENCOUNTER — LAB (OUTPATIENT)
Dept: LAB | Facility: OTHER | Age: 29
End: 2024-10-10
Payer: COMMERCIAL

## 2024-10-10 DIAGNOSIS — Z31.41 FERTILITY TESTING: ICD-10-CM

## 2024-10-10 DIAGNOSIS — Z31.41 FERTILITY TESTING: Primary | ICD-10-CM

## 2024-10-10 LAB — HCG INTACT+B SERPL-ACNC: <1 MIU/ML

## 2024-10-10 PROCEDURE — 84702 CHORIONIC GONADOTROPIN TEST: CPT | Performed by: FAMILY MEDICINE

## 2024-10-10 PROCEDURE — 36415 COLL VENOUS BLD VENIPUNCTURE: CPT | Performed by: FAMILY MEDICINE

## 2024-10-10 PROCEDURE — 84144 ASSAY OF PROGESTERONE: CPT | Performed by: FAMILY MEDICINE

## 2024-10-11 LAB — PROGEST SERPL-MCNC: 0.2 NG/ML

## 2024-10-22 ENCOUNTER — TELEPHONE (OUTPATIENT)
Dept: FAMILY MEDICINE | Facility: OTHER | Age: 29
End: 2024-10-22

## 2024-10-22 ENCOUNTER — MYC MEDICAL ADVICE (OUTPATIENT)
Dept: FAMILY MEDICINE | Facility: OTHER | Age: 29
End: 2024-10-22

## 2024-10-22 NOTE — TELEPHONE ENCOUNTER
Provider is requesting the patient have a physical on Nov 8 at 8:00 or 9:00 in the morning or any opening in the afternoon.    Please call [phone] to schedule.    Attempted to call the patient to schedule.  No answer, no VM set up

## 2024-11-07 SDOH — HEALTH STABILITY: PHYSICAL HEALTH: ON AVERAGE, HOW MANY MINUTES DO YOU ENGAGE IN EXERCISE AT THIS LEVEL?: 40 MIN

## 2024-11-07 SDOH — HEALTH STABILITY: PHYSICAL HEALTH: ON AVERAGE, HOW MANY DAYS PER WEEK DO YOU ENGAGE IN MODERATE TO STRENUOUS EXERCISE (LIKE A BRISK WALK)?: 2 DAYS

## 2024-11-07 ASSESSMENT — SOCIAL DETERMINANTS OF HEALTH (SDOH): HOW OFTEN DO YOU GET TOGETHER WITH FRIENDS OR RELATIVES?: ONCE A WEEK

## 2024-11-07 ASSESSMENT — PATIENT HEALTH QUESTIONNAIRE - PHQ9
SUM OF ALL RESPONSES TO PHQ QUESTIONS 1-9: 4
SUM OF ALL RESPONSES TO PHQ QUESTIONS 1-9: 4
10. IF YOU CHECKED OFF ANY PROBLEMS, HOW DIFFICULT HAVE THESE PROBLEMS MADE IT FOR YOU TO DO YOUR WORK, TAKE CARE OF THINGS AT HOME, OR GET ALONG WITH OTHER PEOPLE: NOT DIFFICULT AT ALL

## 2024-11-08 ENCOUNTER — OFFICE VISIT (OUTPATIENT)
Dept: FAMILY MEDICINE | Facility: OTHER | Age: 29
End: 2024-11-08
Attending: NURSE PRACTITIONER
Payer: COMMERCIAL

## 2024-11-08 VITALS
RESPIRATION RATE: 16 BRPM | WEIGHT: 153.2 LBS | DIASTOLIC BLOOD PRESSURE: 76 MMHG | SYSTOLIC BLOOD PRESSURE: 109 MMHG | BODY MASS INDEX: 28.92 KG/M2 | HEART RATE: 70 BPM | TEMPERATURE: 97.9 F | OXYGEN SATURATION: 100 % | HEIGHT: 61 IN

## 2024-11-08 DIAGNOSIS — N89.8 VAGINAL DISCHARGE: ICD-10-CM

## 2024-11-08 DIAGNOSIS — Z00.00 ROUTINE GENERAL MEDICAL EXAMINATION AT A HEALTH CARE FACILITY: ICD-10-CM

## 2024-11-08 DIAGNOSIS — Z12.4 CERVICAL CANCER SCREENING: Primary | ICD-10-CM

## 2024-11-08 LAB
ALBUMIN SERPL BCG-MCNC: 4.3 G/DL (ref 3.5–5.2)
ALP SERPL-CCNC: 92 U/L (ref 40–150)
ALT SERPL W P-5'-P-CCNC: 20 U/L (ref 0–50)
ANION GAP SERPL CALCULATED.3IONS-SCNC: 12 MMOL/L (ref 7–15)
AST SERPL W P-5'-P-CCNC: 17 U/L (ref 0–45)
BACTERIAL VAGINOSIS VAG-IMP: NEGATIVE
BILIRUB SERPL-MCNC: 0.2 MG/DL
BUN SERPL-MCNC: 9.6 MG/DL (ref 6–20)
CALCIUM SERPL-MCNC: 9.3 MG/DL (ref 8.8–10.4)
CANDIDA DNA VAG QL NAA+PROBE: NOT DETECTED
CANDIDA GLABRATA / CANDIDA KRUSEI DNA: NOT DETECTED
CHLORIDE SERPL-SCNC: 103 MMOL/L (ref 98–107)
CREAT SERPL-MCNC: 0.7 MG/DL (ref 0.51–0.95)
EGFRCR SERPLBLD CKD-EPI 2021: >90 ML/MIN/1.73M2
GLUCOSE SERPL-MCNC: 142 MG/DL (ref 70–99)
HCO3 SERPL-SCNC: 24 MMOL/L (ref 22–29)
POTASSIUM SERPL-SCNC: 4.1 MMOL/L (ref 3.4–5.3)
PROT SERPL-MCNC: 7.3 G/DL (ref 6.4–8.3)
SODIUM SERPL-SCNC: 139 MMOL/L (ref 135–145)
T VAGINALIS DNA VAG QL NAA+PROBE: NOT DETECTED

## 2024-11-08 PROCEDURE — 80053 COMPREHEN METABOLIC PANEL: CPT | Performed by: NURSE PRACTITIONER

## 2024-11-08 PROCEDURE — 99395 PREV VISIT EST AGE 18-39: CPT | Performed by: NURSE PRACTITIONER

## 2024-11-08 PROCEDURE — 0352U MULTIPLEX VAGINAL PANEL BY PCR: CPT | Performed by: NURSE PRACTITIONER

## 2024-11-08 PROCEDURE — 87624 HPV HI-RISK TYP POOLED RSLT: CPT | Performed by: NURSE PRACTITIONER

## 2024-11-08 PROCEDURE — 36415 COLL VENOUS BLD VENIPUNCTURE: CPT | Performed by: NURSE PRACTITIONER

## 2024-11-08 ASSESSMENT — PAIN SCALES - GENERAL: PAINLEVEL_OUTOF10: NO PAIN (0)

## 2024-11-08 NOTE — PATIENT INSTRUCTIONS
Patient Education   Preventive Care Advice   This is general advice given by our system to help you stay healthy. However, your care team may have specific advice just for you. Please talk to your care team about your preventive care needs.  Nutrition  Eat 5 or more servings of fruits and vegetables each day.  Try wheat bread, brown rice and whole grain pasta (instead of white bread, rice, and pasta).  Get enough calcium and vitamin D. Check the label on foods and aim for 100% of the RDA (recommended daily allowance).  Lifestyle  Exercise at least 150 minutes each week  (30 minutes a day, 5 days a week).  Do muscle strengthening activities 2 days a week. These help control your weight and prevent disease.  No smoking.  Wear sunscreen to prevent skin cancer.  Have a dental exam and cleaning every 6 months.  Yearly exams  See your health care team every year to talk about:  Any changes in your health.  Any medicines your care team has prescribed.  Preventive care, family planning, and ways to prevent chronic diseases.  Shots (vaccines)   HPV shots (up to age 26), if you've never had them before.  Hepatitis B shots (up to age 59), if you've never had them before.  COVID-19 shot: Get this shot when it's due.  Flu shot: Get a flu shot every year.  Tetanus shot: Get a tetanus shot every 10 years.  Pneumococcal, hepatitis A, and RSV shots: Ask your care team if you need these based on your risk.  Shingles shot (for age 50 and up)  General health tests  Diabetes screening:  Starting at age 35, Get screened for diabetes at least every 3 years.  If you are younger than age 35, ask your care team if you should be screened for diabetes.  Cholesterol test: At age 39, start having a cholesterol test every 5 years, or more often if advised.  Bone density scan (DEXA): At age 50, ask your care team if you should have this scan for osteoporosis (brittle bones).  Hepatitis C: Get tested at least once in your life.  STIs (sexually  transmitted infections)  Before age 24: Ask your care team if you should be screened for STIs.  After age 24: Get screened for STIs if you're at risk. You are at risk for STIs (including HIV) if:  You are sexually active with more than one person.  You don't use condoms every time.  You or a partner was diagnosed with a sexually transmitted infection.  If you are at risk for HIV, ask about PrEP medicine to prevent HIV.  Get tested for HIV at least once in your life, whether you are at risk for HIV or not.  Cancer screening tests  Cervical cancer screening: If you have a cervix, begin getting regular cervical cancer screening tests starting at age 21.  Breast cancer scan (mammogram): If you've ever had breasts, begin having regular mammograms starting at age 40. This is a scan to check for breast cancer.  Colon cancer screening: It is important to start screening for colon cancer at age 45.  Have a colonoscopy test every 10 years (or more often if you're at risk) Or, ask your provider about stool tests like a FIT test every year or Cologuard test every 3 years.  To learn more about your testing options, visit:   .  For help making a decision, visit:   https://bit.ly/ld30358.  Prostate cancer screening test: If you have a prostate, ask your care team if a prostate cancer screening test (PSA) at age 55 is right for you.  Lung cancer screening: If you are a current or former smoker ages 50 to 80, ask your care team if ongoing lung cancer screenings are right for you.  For informational purposes only. Not to replace the advice of your health care provider. Copyright   2023 [employer]. All rights reserved. Clinically reviewed by the [facility] Transitions Program. [employer] [id number] - REV 01/24.

## 2024-11-08 NOTE — PROGRESS NOTES
"Preventive Care Visit  [facility]  EDWARD Byers CNP, Family Medicine  Nov 8, 2024      Assessment & Plan     Cervical cancer screening  Planning on starting IVF treatment   Will update pap before startin g  - HPV and Gynecologic Cytology Panel - Recommended Age 30 - 65 Years    Routine general medical examination at a health care facility  Continue yearly physicals   - Comprehensive metabolic panel (BMP + Alb, Alk Phos, ALT, AST, Total. Bili, TP); Future  - Comprehensive metabolic panel (BMP + Alb, Alk Phos, ALT, AST, Total. Bili, TP)    Vaginal discharge  Negative for bacterial or yeast infection   - Multiplex Vaginal Panel by PCR; Future  - Multiplex Vaginal Panel by PCR    Patient has been advised of split billing requirements and indicates understanding: Yes        BMI  Estimated body mass index is 28.95 kg/m  as calculated from the following:    Height as of this encounter: 1.549 m (5' 1\").    Weight as of this encounter: 69.5 kg (153 lb 3.2 oz).       Counseling  Appropriate preventive services were addressed with this patient via screening, questionnaire, or discussion as appropriate for fall prevention, nutrition, physical activity, Tobacco-use cessation, social engagement, weight loss and cognition.  Checklist reviewing preventive services available has been given to the patient.  Reviewed patient's diet, addressing concerns and/or questions.   She is at risk for lack of exercise and has been provided with information to increase physical activity for the benefit of her well-being.   She is at risk for psychosocial distress and has been provided with information to reduce risk.       See Patient Instructions    Return in about 53 weeks (around 11/14/2025) for Annual Wellness Visit.    Carlee Ragland is a 29 year old, presenting for the following:  Physical        11/8/2024     8:05 AM   Additional Questions   Roomed by Rodney Mccabe   Accompanied by None         11/8/2024    "  8:05 AM   Patient Reported Additional Medications   Patient reports taking the following new medications None          HPI    Will be starting IVF therapy shortly   Reviewed labs from IVF center -  normal A1c and TSH level         Health Care Directive  Patient does not have a Health Care Directive: Discussed advance care planning with patient; however, patient declined at this time.      11/7/2024   General Health   How would you rate your overall physical health? (!) FAIR   Feel stress (tense, anxious, or unable to sleep) Only a little      (!) STRESS CONCERN      11/7/2024   Nutrition   Three or more servings of calcium each day? (!) NO   Diet: Regular (no restrictions)   How many servings of fruit and vegetables per day? (!) 0-1   How many sweetened beverages each day? (!) 3            11/7/2024   Exercise   Days per week of moderate/strenous exercise 2 days   Average minutes spent exercising at this level 40 min      (!) EXERCISE CONCERN      11/7/2024   Social Factors   Frequency of gathering with friends or relatives Once a week   Worry food won't last until get money to buy more No   Food not last or not have enough money for food? No   Do you have housing? (Housing is defined as stable permanent housing and does not include staying ouside in a car, in a tent, in an abandoned building, in an overnight shelter, or couch-surfing.) Yes   Are you worried about losing your housing? No   Lack of transportation? No   Unable to get utilities (heat,electricity)? No            11/7/2024   Dental   Dentist two times every year? Yes            11/7/2024   TB Screening   Were you born outside of the [place]? No          Today's PHQ-9 Score:       11/7/2024     9:28 PM   PHQ-9 SCORE   PHQ-9 Total Score MyChart 4 (Minimal depression)   PHQ-9 Total Score 4        Patient-reported         11/7/2024   Substance Use   Alcohol more than 3/day or more than 7/wk No   Do you use any other substances recreationally? No        Social  History     Tobacco Use    Smoking status: Never     Passive exposure: Never    Smokeless tobacco: Never   Vaping Use    Vaping status: Never Used   Substance Use Topics    Alcohol use: Yes     Comment: Occasionally    Drug use: Never           3/30/2022   LAST FHS-7 RESULTS   1st degree relative breast or ovarian cancer No    Any relative bilateral breast cancer No    Any male have breast cancer No    Any ONE woman have BOTH breast AND ovarian cancer No    Any woman with breast cancer before 50yrs No    2 or more relatives with breast AND/OR ovarian cancer No    2 or more relatives with breast AND/OR bowel cancer No        Patient-reported        Mammogram Screening - Patient under 40 years of age: Routine Mammogram Screening not recommended.           11/7/2024   One time HIV Screening   Previous HIV test? Yes          11/7/2024   STI Screening   New sexual partner(s) since last STI/HIV test? No        History of abnormal Pap smear:         4/6/2022     9:27 AM 11/6/2019    12:44 PM   PAP / HPV   PAP Negative for Intraepithelial Lesion or Malignancy (NILM)     PAP (Historical)  NIL            11/7/2024   Contraception/Family Planning   Questions about contraception or family planning No           Reviewed and updated as needed this visit by Provider                    Labs reviewed in EPIC  BP Readings from Last 3 Encounters:   11/08/24 109/76   06/18/24 102/58   03/25/24 110/70    Wt Readings from Last 3 Encounters:   11/08/24 69.5 kg (153 lb 3.2 oz)   06/18/24 65.3 kg (144 lb)   03/25/24 67.4 kg (148 lb 11.2 oz)                  Patient Active Problem List   Diagnosis    DUB (dysfunctional uterine bleeding)-always--12/2018    PCOS (polycystic ovarian syndrome)--clinical signs and syptoms--12/2018    Irritable bowel syndrome with loose stools--IBS diet,Dicyclomine--12/2018    Cystic acne    Premenstrual syndrome    Dysmenorrhea    Irregular menstrual cycle    Family history of autoimmune disorder     Hypovitaminosis D    Other fatigue    Elevated prolactin level    Low iron    BV (bacterial vaginosis)    Procreative counseling and advice using natural family planning    Mild episode of recurrent major depressive disorder (H)     Past Surgical History:   Procedure Laterality Date    ADENOIDECTOMY      BIOPSY      COLONOSCOPY N/A 04/09/2021    Procedure: COLONOSCOPY;  Surgeon: Sd Vaca MD;  Location: [facility] OR    [facility] RAD RESEC TONSIL/PILLARS         Social History     Tobacco Use    Smoking status: Never     Passive exposure: Never    Smokeless tobacco: Never   Substance Use Topics    Alcohol use: Yes     Comment: Occasionally     Family History   Problem Relation Age of Onset    Colorectal Cancer Mother 54    Colon Cancer Mother     Throat cancer Father     Esophageal Cancer Father         +tobacco, +etoh    Alcoholism Father     Other Cancer Father         Esophogeal    Rheumatoid Arthritis Sister 17    Endometriosis Sister     Family History Negative Brother     Cerebrovascular Disease Maternal Grandmother     Heart Disease Maternal Grandfather     Bladder Cancer Paternal Grandmother         +tobacco    Heart Disease Paternal Grandfather         ??    Diabetes Other     Dementia Other     Diabetes Other          Current Outpatient Medications   Medication Sig Dispense Refill    Prenatal Vit-Fe Fumarate-FA (PRENATAL VITAMIN) 27-0.8 MG TABS       valACYclovir (VALTREX) 500 MG tablet Take 1 tablet (500 mg) by mouth 2 times daily 6 tablet 3    clindamycin (CLEOCIN T) 1 % external solution Apply topically 2 times daily (Patient not taking: Reported on 11/8/2024) 60 mL 1     Allergies   Allergen Reactions    Sulfa Antibiotics Hives    Metformin GI Disturbance         Review of Systems  CONSTITUTIONAL: NEGATIVE for fever, chills, change in weight  INTEGUMENTARY/SKIN: NEGATIVE for worrisome rashes, moles or lesions  EYES: NEGATIVE for vision changes or irritation  ENT/MOUTH: nasal congestion   RESP:NEGATIVE  "for significant cough or SOB  CV: NEGATIVE for chest pain, palpitations or peripheral edema  GI: NEGATIVE for nausea, abdominal pain, heartburn, or change in bowel habits  : frequent brow vaginal discharge since starting daily birth control while waiting to start IVF therapy   MUSCULOSKELETAL: NEGATIVE for significant arthralgias or myalgia  NEURO: NEGATIVE for weakness, dizziness or paresthesias  ENDOCRINE: NEGATIVE for temperature intolerance, skin/hair changes  PSYCHIATRIC: NEGATIVE for changes in mood or affect     Objective    Exam  /76 (BP Location: Right arm, Patient Position: Sitting, Cuff Size: Adult Regular)   Pulse 70   Temp 97.9  F (36.6  C) (Tympanic)   Resp 16   Ht 1.549 m (5' 1\")   Wt 69.5 kg (153 lb 3.2 oz)   LMP 10/27/2024 (Within Days)   SpO2 100%   BMI 28.95 kg/m     Estimated body mass index is 28.95 kg/m  as calculated from the following:    Height as of this encounter: 1.549 m (5' 1\").    Weight as of this encounter: 69.5 kg (153 lb 3.2 oz).    Physical Exam  GENERAL: alert and no distress  EYES: Eyes grossly normal to inspection, PERRL and conjunctivae and sclerae normal  HENT: normal cephalic/atraumatic, ear canals and TM's normal, oropharynx clear, oral mucous membranes moist, and nasal congestion   NECK: no adenopathy, no asymmetry, masses, or scars  RESP: lungs clear to auscultation - no rales, rhonchi or wheezes  CV: regular rate and rhythm, normal S1 S2, no S3 or S4, no murmur, click or rub, no peripheral edema  ABDOMEN: soft, nontender, no hepatosplenomegaly, no masses and bowel sounds normal   (female): normal female external genitalia, normal urethral meatus , normal vaginal mucosa, vaginal discharge - moderate and brown, and normal cervix, adnexae, and uterus without masses.  MS: no gross musculoskeletal defects noted, no edema  SKIN: no suspicious lesions or rashes  NEURO: Normal strength and tone, mentation intact and speech normal  PSYCH: mentation appears " normal, affect normal/bright  LYMPH: no cervical, supraclavicular, axillary, or inguinal adenopathy        Signed Electronically by: EDWARD Byers CNP    Answers submitted by the patient for this visit:  Patient Health Questionnaire (Submitted on 11/7/2024)  If you checked off any problems, how difficult have these problems made it for you to do your work, take care of things at home, or get along with other people?: Not difficult at all  PHQ9 TOTAL SCORE: 4

## 2024-11-11 LAB
HPV HR 12 DNA CVX QL NAA+PROBE: NEGATIVE
HPV16 DNA CVX QL NAA+PROBE: NEGATIVE
HPV18 DNA CVX QL NAA+PROBE: NEGATIVE
HUMAN PAPILLOMA VIRUS FINAL DIAGNOSIS: NORMAL

## 2024-11-15 LAB
BKR AP ASSOCIATED HPV REPORT: NORMAL
BKR LAB AP GYN ADEQUACY: NORMAL
BKR LAB AP GYN INTERPRETATION: NORMAL
BKR LAB AP PREVIOUS ABNORMAL: NORMAL
PATH REPORT.COMMENTS IMP SPEC: NORMAL
PATH REPORT.COMMENTS IMP SPEC: NORMAL
PATH REPORT.RELEVANT HX SPEC: NORMAL

## 2025-01-07 ENCOUNTER — APPOINTMENT (OUTPATIENT)
Dept: OCCUPATIONAL MEDICINE | Facility: OTHER | Age: 30
End: 2025-01-07

## 2025-01-07 PROCEDURE — 86580 TB INTRADERMAL TEST: CPT

## 2025-01-16 ENCOUNTER — APPOINTMENT (OUTPATIENT)
Dept: OCCUPATIONAL MEDICINE | Facility: OTHER | Age: 30
End: 2025-01-16

## 2025-01-16 PROCEDURE — 86580 TB INTRADERMAL TEST: CPT

## 2025-02-20 ENCOUNTER — APPOINTMENT (OUTPATIENT)
Dept: OCCUPATIONAL MEDICINE | Facility: OTHER | Age: 30
End: 2025-02-20
Attending: PHYSICAL MEDICINE & REHABILITATION

## 2025-02-20 PROCEDURE — 86580 TB INTRADERMAL TEST: CPT

## (undated) DEVICE — SUCTION MANIFOLD NEPTUNE 2 SYS 1 PORT 702-025-000

## (undated) DEVICE — KIT ENDO FIRST STEP DISINFECTANT 200ML W/POUCH EP-4

## (undated) DEVICE — TUBING SUCTION 12"X1/4" N612

## (undated) DEVICE — SPECIMEN CONTAINER 3OZ W/FORMALIN 59901

## (undated) DEVICE — SOL WATER IRRIG 500ML BOTTLE 2F7113

## (undated) DEVICE — KIT ENDO TURNOVER/PROCEDURE CARRY-ON 101822

## (undated) DEVICE — GOWN IMPERVIOUS 2XL BLUE

## (undated) RX ORDER — FENTANYL CITRATE 50 UG/ML
INJECTION, SOLUTION INTRAMUSCULAR; INTRAVENOUS
Status: DISPENSED
Start: 2021-04-09

## (undated) RX ORDER — DIPHENHYDRAMINE HYDROCHLORIDE 50 MG/ML
INJECTION INTRAMUSCULAR; INTRAVENOUS
Status: DISPENSED
Start: 2021-04-09

## (undated) RX ORDER — ONDANSETRON 2 MG/ML
INJECTION INTRAMUSCULAR; INTRAVENOUS
Status: DISPENSED
Start: 2021-04-09